# Patient Record
Sex: FEMALE | Race: WHITE | NOT HISPANIC OR LATINO | Employment: FULL TIME | URBAN - METROPOLITAN AREA
[De-identification: names, ages, dates, MRNs, and addresses within clinical notes are randomized per-mention and may not be internally consistent; named-entity substitution may affect disease eponyms.]

---

## 2018-10-11 ENCOUNTER — HOSPITAL ENCOUNTER (OUTPATIENT)
Facility: MEDICAL CENTER | Age: 58
End: 2018-10-11
Payer: COMMERCIAL

## 2018-10-11 PROCEDURE — 82947 ASSAY GLUCOSE BLOOD QUANT: CPT

## 2018-10-11 PROCEDURE — 80061 LIPID PANEL: CPT

## 2018-10-12 LAB
CHOLEST SERPL-MCNC: 196 MG/DL (ref 100–199)
FASTING STATUS PATIENT QL REPORTED: NORMAL
GLUCOSE SERPL-MCNC: 98 MG/DL (ref 65–99)
HDLC SERPL-MCNC: 67 MG/DL
LDLC SERPL CALC-MCNC: 117 MG/DL
TRIGL SERPL-MCNC: 60 MG/DL (ref 0–149)

## 2019-04-08 ENCOUNTER — TELEPHONE (OUTPATIENT)
Dept: SCHEDULING | Facility: IMAGING CENTER | Age: 59
End: 2019-04-08

## 2019-06-03 ENCOUNTER — OFFICE VISIT (OUTPATIENT)
Dept: MEDICAL GROUP | Facility: PHYSICIAN GROUP | Age: 59
End: 2019-06-03
Payer: COMMERCIAL

## 2019-06-03 VITALS
SYSTOLIC BLOOD PRESSURE: 130 MMHG | BODY MASS INDEX: 24.91 KG/M2 | TEMPERATURE: 97.3 F | WEIGHT: 155 LBS | DIASTOLIC BLOOD PRESSURE: 80 MMHG | HEART RATE: 74 BPM | OXYGEN SATURATION: 100 % | HEIGHT: 66 IN

## 2019-06-03 DIAGNOSIS — G47.00 INSOMNIA, UNSPECIFIED TYPE: ICD-10-CM

## 2019-06-03 DIAGNOSIS — F41.9 ANXIETY: ICD-10-CM

## 2019-06-03 DIAGNOSIS — Z00.00 BLOOD TESTS FOR ROUTINE GENERAL PHYSICAL EXAMINATION: ICD-10-CM

## 2019-06-03 DIAGNOSIS — L65.9 HAIR LOSS: ICD-10-CM

## 2019-06-03 PROCEDURE — 99204 OFFICE O/P NEW MOD 45 MIN: CPT | Performed by: PHYSICIAN ASSISTANT

## 2019-06-03 RX ORDER — CITALOPRAM HYDROBROMIDE 10 MG/1
10 TABLET ORAL DAILY
Qty: 30 TAB | Refills: 2 | Status: SHIPPED | OUTPATIENT
Start: 2019-06-03 | End: 2019-07-01

## 2019-06-03 ASSESSMENT — PATIENT HEALTH QUESTIONNAIRE - PHQ9
CLINICAL INTERPRETATION OF PHQ2 SCORE: 1
SUM OF ALL RESPONSES TO PHQ QUESTIONS 1-9: 11
5. POOR APPETITE OR OVEREATING: 1 - SEVERAL DAYS

## 2019-06-03 NOTE — PROGRESS NOTES
"Edel Wagner is a 59 y.o. female here for hair loss and multiple concerns. Is a new patient to me and [facility] and is also establishing care today.    Previous PCP: None    HPI:      Patient presents to the office today to establish care with me. She denies having a previous PCP. She is here with multiple concerns today which are detailed below:    \"Half my hair fell out.\" Has noticed ongoing hair loss over the last 2 months. When she showers, pulls out handfuls of hair. She saved it to see how much she's been losing and it has amounted to two large handfuls. She recently started eating more red meat and protein and has noticed some re-growth since then--prior to that mostly adhered to vegan diet.    \"I have trouble just relaxing.\" Feels tense a lot of the time. Having trouble focusing with frequent racing thoughts and feeling of being unable to catch her breath. Is trying to stay motivated but having difficulty. She feels she may have \"a touch\" of depression. Still feels able to keep up with daily activities and house chores. She endorses a lot of responsibilities at work which she feels is where most of her stress is coming from. She feels this anxiety has been ongoing since 2010 when she became a single mother and moved to this area. Has tried to manage conservatively with eating right, exercising, and apple cider vinegar, breathing exercises--none of which are significantly helping. One of her friends gave her a lorazepam tablet which seemed to help with her racing thoughts but made her sleepy which she didn't like.    \"I'm having trouble sleeping.\" Wakes up frequently throughout the night. Has to turn TV on \"to shut my mind off\" and then is able to go back to sleep. Has started vaping medical marijuana which was recommended by a friend of hers. She does feel that it helps her to fall asleep initially but still has difficulty staying asleep.     \"I hurt my toe.\" Over the holidays, ran into something and hit " her right foot. Then about 6 months later, fell over dog gate hitting the same spot. Still swollen and bruised.    Depression Screening    Little interest or pleasure in doing things?  0 - not at all   Feeling down, depressed , or hopeless? 1 - several days   Trouble falling or staying asleep, or sleeping too much?  3 - nearly every day   Feeling tired or having little energy?  3 - nearly every day   Poor appetite or overeating?  1 - several days   Feeling bad about yourself - or that you are a failure or have let yourself or your family down? 3 - nearly every day   Trouble concentrating on things, such as reading the newspaper or watching television? 0 - not at all   Moving or speaking so slowly that other people could have noticed.  Or the opposite - being so fidgety or restless that you have been moving around a lot more than usual?  0 - not at all   Thoughts that you would be better off dead, or of hurting yourself?  0 - not at all   Patient Health Questionnaire Score: 11     CHRISTOPH-7: 15    Current medicines (including changes today)  No current outpatient prescriptions on file.     No current facility-administered medications for this visit.      She  has no past medical history on file.  She  has a past surgical history that includes dilation and curettage (2000).  Social History   Substance Use Topics   • Smoking status: Never Smoker   • Smokeless tobacco: Never Used   • Alcohol use No     Social History     Social History Narrative   • No narrative on file     Family History   Problem Relation Age of Onset   • GI Mother         IBS   • Cancer Father         mesothelioma   • Depression Father    • Hypertension Father    • Seizures Father         epilepsy     No family status information on file.         ROS  +weight gain (10 lbs in last 6 months)  +cold intolerance  All other systems reviewed and are negative       Objective:     /80 (BP Location: Right arm, Patient Position: Sitting, BP Cuff Size: Adult)  "  Pulse 74   Temp 36.3 °C (97.3 °F)   Ht 1.676 m (5' 6\")   Wt 70.3 kg (155 lb)   SpO2 100%  Body mass index is 25.02 kg/m².  Physical Exam:    Constitutional: Alert, well-appearing, very pleasant, no distress.  Skin: Warm, dry, good turgor, no rashes in visible areas.  Eye: Equal, round and reactive, conjunctiva clear, lids normal.  ENMT: Lips without lesions, good dentition, oropharynx clear.  Neck: Trachea midline, no masses, no thyromegaly. No cervical or supraclavicular lymphadenopathy.  Respiratory: Unlabored respiratory effort, lungs clear to auscultation, no wheezes, no ronchi.  Cardiovascular: Normal S1, S2, no murmur, no edema.  Abdomen: Soft, non-tender, no masses, no hepatosplenomegaly.  Psych: Alert and oriented x3, normal affect and mood. Appears somewhat anxious.      Assessment and Plan:   The following treatment plan was discussed    1. Hair loss  New problem, uncontrolled. Noticing ongoing hair loss. Will obtain thyroid and other screening lab work to rule out secondary cause. Results to be discussed at next office visit.  - TSH WITH REFLEX TO FT4; Future    2. Anxiety  New problem, uncontrolled. Ongoing symptoms since 2010 with progressive worsening. CHRISTOPH-7 screening completed in office with score of 15. Depression screening (PHQ-9) score was 11, but most of her positive symptoms are related to her anxiety, so does not currently fit criteria for MDD. Labs as below to rule out secondary contributors. Given daily symptoms with functional limitation, recommend maintenance medication which she is agreeable with. Will start citalopram 10 mg daily with follow-up in 1 month. We discussed rationale for avoidance of BZD treatment.  - TSH WITH REFLEX TO FT4; Future  - citalopram (CELEXA) 10 MG tablet; Take 1 Tab by mouth every day.  Dispense: 30 Tab; Refill: 2    3. Insomnia, unspecified type  New problem, uncontrolled and suspect related to current anxiety issues. Hopefully will improve with SSRI " treatment. Will re-assess at next office visit.    4. Blood tests for routine general physical examination  Patient advised to complete fasting screening lab work prior to next office visit.  - CBC WITH DIFFERENTIAL; Future  - Comp Metabolic Panel; Future  - Lipid Profile; Future  - TSH WITH REFLEX TO FT4; Future  - VITAMIN D,25 HYDROXY; Future      Followup: Return in about 1 month (around 7/3/2019) for follow-up anxiety, lab results; Short.    Hilaria Davis P.A.-C.

## 2019-06-03 NOTE — PATIENT INSTRUCTIONS
Citalopram tablets  What is this medicine?  CITALOPRAM (sye ROBBY oh pram) is a medicine for depression.  This medicine may be used for other purposes; ask your health care provider or pharmacist if you have questions.  COMMON BRAND NAME(S): Celexa  What should I tell my health care provider before I take this medicine?  They need to know if you have any of these conditions:  -bleeding disorders  -bipolar disorder or a family history of bipolar disorder  -glaucoma  -heart disease  -history of irregular heartbeat  -kidney disease  -liver disease  -low levels of magnesium or potassium in the blood  -receiving electroconvulsive therapy  -seizures  -suicidal thoughts, plans, or attempt; a previous suicide attempt by you or a family member  -take medicines that treat or prevent blood clots  -thyroid disease  -an unusual or allergic reaction to citalopram, escitalopram, other medicines, foods, dyes, or preservatives  -pregnant or trying to become pregnant  -breast-feeding  How should I use this medicine?  Take this medicine by mouth with a glass of water. Follow the directions on the prescription label. You can take it with or without food. Take your medicine at regular intervals. Do not take your medicine more often than directed. Do not stop taking this medicine suddenly except upon the advice of your doctor. Stopping this medicine too quickly may cause serious side effects or your condition may worsen.  A special MedGuide will be given to you by the pharmacist with each prescription and refill. Be sure to read this information carefully each time.  Talk to your pediatrician regarding the use of this medicine in children. Special care may be needed.  Patients over 60 years old may have a stronger reaction and need a smaller dose.  Overdosage: If you think you have taken too much of this medicine contact a poison control center or emergency room at once.  NOTE: This medicine is only for you. Do not share this medicine with  others.  What if I miss a dose?  If you miss a dose, take it as soon as you can. If it is almost time for your next dose, take only that dose. Do not take double or extra doses.  What may interact with this medicine?  Do not take this medicine with any of the following medications:  -certain medicines for fungal infections like fluconazole, itraconazole, ketoconazole, posaconazole, voriconazole  -cisapride  -dofetilide  -dronedarone  -escitalopram  -linezolid  -MAOIs like Carbex, Eldepryl, Marplan, Nardil, and Parnate  -methylene blue (injected into a vein)  -pimozide  -thioridazine  -ziprasidone  This medicine may also interact with the following medications:  -alcohol  -amphetamines  -aspirin and aspirin-like medicines  -carbamazepine  -certain medicines for depression, anxiety, or psychotic disturbances  -certain medicines for infections like chloroquine, clarithromycin, erythromycin, furazolidone, isoniazid, pentamidine  -certain medicines for migraine headaches like almotriptan, eletriptan, frovatriptan, naratriptan, rizatriptan, sumatriptan, zolmitriptan  -certain medicines for sleep  -certain medicines that treat or prevent blood clots like dalteparin, enoxaparin, warfarin  -cimetidine  -diuretics  -fentanyl  -lithium  -methadone  -metoprolol  -NSAIDs, medicines for pain and inflammation, like ibuprofen or naproxen  -omeprazole  -other medicines that prolong the QT interval (cause an abnormal heart rhythm)  -procarbazine  -rasagiline  -supplements like Courtney's wort, kava kava, valerian  -tramadol  -tryptophan  This list may not describe all possible interactions. Give your health care provider a list of all the medicines, herbs, non-prescription drugs, or dietary supplements you use. Also tell them if you smoke, drink alcohol, or use illegal drugs. Some items may interact with your medicine.  What should I watch for while using this medicine?  Tell your doctor if your symptoms do not get better or if they  get worse. Visit your doctor or health care professional for regular checks on your progress. Because it may take several weeks to see the full effects of this medicine, it is important to continue your treatment as prescribed by your doctor.  Patients and their families should watch out for new or worsening thoughts of suicide or depression. Also watch out for sudden changes in feelings such as feeling anxious, agitated, panicky, irritable, hostile, aggressive, impulsive, severely restless, overly excited and hyperactive, or not being able to sleep. If this happens, especially at the beginning of treatment or after a change in dose, call your health care professional.  You may get drowsy or dizzy. Do not drive, use machinery, or do anything that needs mental alertness until you know how this medicine affects you. Do not stand or sit up quickly, especially if you are an older patient. This reduces the risk of dizzy or fainting spells. Alcohol may interfere with the effect of this medicine. Avoid alcoholic drinks.  Your mouth may get dry. Chewing sugarless gum or sucking hard candy, and drinking plenty of water will help. Contact your doctor if the problem does not go away or is severe.  What side effects may I notice from receiving this medicine?  Side effects that you should report to your doctor or health care professional as soon as possible:  -allergic reactions like skin rash, itching or hives, swelling of the face, lips, or tongue  -anxious  -black, tarry stools  -breathing problems  -changes in vision  -chest pain  -confusion  -elevated mood, decreased need for sleep, racing thoughts, impulsive behavior  -eye pain  -fast, irregular heartbeat  -feeling faint or lightheaded, falls  -feeling agitated, angry, or irritable  -hallucination, loss of contact with reality  -loss of balance or coordination  -loss of memory  -painful or prolonged erections  -restlessness, pacing, inability to keep  still  -seizures  -stiff muscles  -suicidal thoughts or other mood changes  -trouble sleeping  -unusual bleeding or bruising  -unusually weak or tired  -vomiting  Side effects that usually do not require medical attention (report to your doctor or health care professional if they continue or are bothersome):  -change in appetite or weight  -change in sex drive or performance  -dizziness  -headache  -increased sweating  -indigestion, nausea  -tremors  This list may not describe all possible side effects. Call your doctor for medical advice about side effects. You may report side effects to FDA at 3-882-FDA-5437.  Where should I keep my medicine?  Keep out of reach of children.  Store at room temperature between 15 and 30 degrees C (59 and 86 degrees F). Throw away any unused medicine after the expiration date.  NOTE: This sheet is a summary. It may not cover all possible information. If you have questions about this medicine, talk to your doctor, pharmacist, or health care provider.  © 2018 [employer]vier/Gold Standard (2017-05-22 13:18:52)

## 2019-06-04 ENCOUNTER — HOSPITAL ENCOUNTER (OUTPATIENT)
Dept: LAB | Facility: MEDICAL CENTER | Age: 59
End: 2019-06-04
Attending: PHYSICIAN ASSISTANT
Payer: COMMERCIAL

## 2019-06-04 DIAGNOSIS — L65.9 HAIR LOSS: ICD-10-CM

## 2019-06-04 DIAGNOSIS — F41.9 ANXIETY: ICD-10-CM

## 2019-06-04 DIAGNOSIS — Z00.00 BLOOD TESTS FOR ROUTINE GENERAL PHYSICAL EXAMINATION: ICD-10-CM

## 2019-06-04 LAB
25(OH)D3 SERPL-MCNC: 13 NG/ML (ref 30–100)
ALBUMIN SERPL BCP-MCNC: 4.1 G/DL (ref 3.2–4.9)
ALBUMIN/GLOB SERPL: 1.4 G/DL
ALP SERPL-CCNC: 51 U/L (ref 30–99)
ALT SERPL-CCNC: 12 U/L (ref 2–50)
ANION GAP SERPL CALC-SCNC: 4 MMOL/L (ref 0–11.9)
AST SERPL-CCNC: 17 U/L (ref 12–45)
BASOPHILS # BLD AUTO: 0.8 % (ref 0–1.8)
BASOPHILS # BLD: 0.03 K/UL (ref 0–0.12)
BILIRUB SERPL-MCNC: 0.5 MG/DL (ref 0.1–1.5)
BUN SERPL-MCNC: 9 MG/DL (ref 8–22)
CALCIUM SERPL-MCNC: 9.2 MG/DL (ref 8.5–10.5)
CHLORIDE SERPL-SCNC: 105 MMOL/L (ref 96–112)
CHOLEST SERPL-MCNC: 164 MG/DL (ref 100–199)
CO2 SERPL-SCNC: 29 MMOL/L (ref 20–33)
CREAT SERPL-MCNC: 0.69 MG/DL (ref 0.5–1.4)
EOSINOPHIL # BLD AUTO: 0.23 K/UL (ref 0–0.51)
EOSINOPHIL NFR BLD: 5.9 % (ref 0–6.9)
ERYTHROCYTE [DISTWIDTH] IN BLOOD BY AUTOMATED COUNT: 38.5 FL (ref 35.9–50)
FASTING STATUS PATIENT QL REPORTED: NORMAL
GLOBULIN SER CALC-MCNC: 2.9 G/DL (ref 1.9–3.5)
GLUCOSE SERPL-MCNC: 81 MG/DL (ref 65–99)
HCT VFR BLD AUTO: 42.1 % (ref 37–47)
HDLC SERPL-MCNC: 57 MG/DL
HGB BLD-MCNC: 13.8 G/DL (ref 12–16)
IMM GRANULOCYTES # BLD AUTO: 0 K/UL (ref 0–0.11)
IMM GRANULOCYTES NFR BLD AUTO: 0 % (ref 0–0.9)
LDLC SERPL CALC-MCNC: 97 MG/DL
LYMPHOCYTES # BLD AUTO: 1.32 K/UL (ref 1–4.8)
LYMPHOCYTES NFR BLD: 33.8 % (ref 22–41)
MCH RBC QN AUTO: 28.6 PG (ref 27–33)
MCHC RBC AUTO-ENTMCNC: 32.8 G/DL (ref 33.6–35)
MCV RBC AUTO: 87.3 FL (ref 81.4–97.8)
MONOCYTES # BLD AUTO: 0.41 K/UL (ref 0–0.85)
MONOCYTES NFR BLD AUTO: 10.5 % (ref 0–13.4)
NEUTROPHILS # BLD AUTO: 1.92 K/UL (ref 2–7.15)
NEUTROPHILS NFR BLD: 49 % (ref 44–72)
NRBC # BLD AUTO: 0 K/UL
NRBC BLD-RTO: 0 /100 WBC
PLATELET # BLD AUTO: 164 K/UL (ref 164–446)
PMV BLD AUTO: 11.7 FL (ref 9–12.9)
POTASSIUM SERPL-SCNC: 4.3 MMOL/L (ref 3.6–5.5)
PROT SERPL-MCNC: 7 G/DL (ref 6–8.2)
RBC # BLD AUTO: 4.82 M/UL (ref 4.2–5.4)
SODIUM SERPL-SCNC: 138 MMOL/L (ref 135–145)
TRIGL SERPL-MCNC: 49 MG/DL (ref 0–149)
TSH SERPL DL<=0.005 MIU/L-ACNC: 1.29 UIU/ML (ref 0.38–5.33)
WBC # BLD AUTO: 3.9 K/UL (ref 4.8–10.8)

## 2019-06-04 PROCEDURE — 36415 COLL VENOUS BLD VENIPUNCTURE: CPT

## 2019-06-04 PROCEDURE — 80053 COMPREHEN METABOLIC PANEL: CPT

## 2019-06-04 PROCEDURE — 82306 VITAMIN D 25 HYDROXY: CPT

## 2019-06-04 PROCEDURE — 80061 LIPID PANEL: CPT

## 2019-06-04 PROCEDURE — 84443 ASSAY THYROID STIM HORMONE: CPT

## 2019-06-04 PROCEDURE — 85025 COMPLETE CBC W/AUTO DIFF WBC: CPT

## 2019-06-05 DIAGNOSIS — E55.9 VITAMIN D DEFICIENCY: ICD-10-CM

## 2019-06-05 RX ORDER — ERGOCALCIFEROL 1.25 MG/1
50000 CAPSULE ORAL
Qty: 12 CAP | Refills: 0 | Status: SHIPPED | OUTPATIENT
Start: 2019-06-05 | End: 2020-11-03

## 2019-06-06 ENCOUNTER — TELEPHONE (OUTPATIENT)
Dept: MEDICAL GROUP | Facility: PHYSICIAN GROUP | Age: 59
End: 2019-06-06

## 2019-06-06 NOTE — TELEPHONE ENCOUNTER
Pt. Called stating that her anxiety medication that sh was recently placed on works well for her while she is at home but it does not work for her while she is at work. Pt. Is asking if she can double her dose. Pt. Is  Currently taking 10MG of citalopram

## 2019-07-01 ENCOUNTER — OFFICE VISIT (OUTPATIENT)
Dept: MEDICAL GROUP | Facility: PHYSICIAN GROUP | Age: 59
End: 2019-07-01
Payer: COMMERCIAL

## 2019-07-01 VITALS
WEIGHT: 155 LBS | RESPIRATION RATE: 18 BRPM | DIASTOLIC BLOOD PRESSURE: 70 MMHG | SYSTOLIC BLOOD PRESSURE: 108 MMHG | OXYGEN SATURATION: 97 % | HEIGHT: 66 IN | TEMPERATURE: 97.6 F | HEART RATE: 78 BPM | BODY MASS INDEX: 24.91 KG/M2

## 2019-07-01 DIAGNOSIS — Z12.39 ENCOUNTER FOR SCREENING FOR MALIGNANT NEOPLASM OF BREAST: ICD-10-CM

## 2019-07-01 DIAGNOSIS — F41.9 ANXIETY: ICD-10-CM

## 2019-07-01 DIAGNOSIS — E55.9 VITAMIN D DEFICIENCY: ICD-10-CM

## 2019-07-01 PROCEDURE — 99214 OFFICE O/P EST MOD 30 MIN: CPT | Performed by: PHYSICIAN ASSISTANT

## 2019-07-01 RX ORDER — CITALOPRAM 20 MG/1
20 TABLET ORAL DAILY
Qty: 30 TAB | Refills: 5 | Status: SHIPPED | OUTPATIENT
Start: 2019-07-01 | End: 2019-08-05

## 2019-07-01 NOTE — PROGRESS NOTES
"Subjective:   Edel Wagner is a 59 y.o. female here today for follow-up on anxiety, lab results. Is an established patient of mine.    HPI:    Edel returns today for follow-up on issues discussed at last office visit 1 month ago. At that time, Edel had complained of significant anxiety as well as difficulty sleeping with frequent nighttime awakenings. I started her on citalopram 10 mg daily. She states that after about 3 days, she increased on her own to 20 mg daily as she did not feel that the 10 mg was enough. Since making this change, she has noticed a significant improvement in her symptoms. She tells me that she feels peace in her soul, which is something she hasn't felt in a long time. She was experiencing some mild nausea in the beginning, but this has resolved with no GI symptoms currently.    Labs were obtained after Edel's last appointment to help rule out secondary medical conditions. Vitamin D level was noted to be quite low at 13 and patient has already been started on prescription weekly supplement. Labs were otherwise normal. She has noticed that an aching sensation in her bones that she was experiencing has started to improve since starting the supplement. She also feels that her hair loss has slowed down.      Current medicines (including changes today)  Current Outpatient Prescriptions   Medication Sig Dispense Refill   • citalopram (CELEXA) 20 MG Tab Take 1 Tab by mouth every day. 30 Tab 5   • vitamin D, Ergocalciferol, (DRISDOL) 63287 units Cap capsule Take 1 Cap by mouth every 7 days. 12 Cap 0     No current facility-administered medications for this visit.      She  has no past medical history on file.    ROS  As per HPI.       Objective:     /70 (BP Location: Right arm, Patient Position: Sitting, BP Cuff Size: Adult)   Pulse 78   Temp 36.4 °C (97.6 °F) (Temporal)   Resp 18   Ht 1.676 m (5' 6\")   Wt 70.3 kg (155 lb)   SpO2 97%  Body mass index is 25.02 kg/m². "     Physical Exam:  Constitutional: Alert, well-appearing, very pleasant, no distress.  Skin: No rashes in visible areas.  Eye: Pupils are equal and round, conjunctiva clear, lids normal.  ENMT: Lips without lesions, moist mucus membranes.      Assessment and Plan:   The following treatment plan was discussed    1. Anxiety  Established problem, well-controlled since starting SSRI in the form of citalopram. Doing well on 20 mg daily. Tolerating well. Will continue current dose with next f/u in 6 months.  - citalopram (CELEXA) 20 MG Tab; Take 1 Tab by mouth every day.  Dispense: 30 Tab; Refill: 5    2. Vitamin D deficiency  New problem, uncontrolled but likely improving with prescription supplementation. Patient advised to continue supplement for total of 12 weeks and then have level re-checked. Discussed that depending on repeat level, will either continue prescription supplement or have her transition to OTC.    3. Encounter for screening for malignant neoplasm of breast  - MA-SCREEN MAMMO W/CAD-BILAT; Future      Followup: Return in about 6 months (around 1/1/2020) for f/u anxiety; Short.    Hilaria Davis P.A.-C.

## 2019-08-05 ENCOUNTER — OFFICE VISIT (OUTPATIENT)
Dept: MEDICAL GROUP | Facility: PHYSICIAN GROUP | Age: 59
End: 2019-08-05
Payer: COMMERCIAL

## 2019-08-05 ENCOUNTER — HOSPITAL ENCOUNTER (OUTPATIENT)
Dept: RADIOLOGY | Facility: MEDICAL CENTER | Age: 59
End: 2019-08-05
Attending: PHYSICIAN ASSISTANT
Payer: COMMERCIAL

## 2019-08-05 VITALS
DIASTOLIC BLOOD PRESSURE: 74 MMHG | TEMPERATURE: 97.5 F | OXYGEN SATURATION: 97 % | SYSTOLIC BLOOD PRESSURE: 110 MMHG | WEIGHT: 155 LBS | HEART RATE: 68 BPM | BODY MASS INDEX: 24.91 KG/M2 | HEIGHT: 66 IN

## 2019-08-05 DIAGNOSIS — F41.9 ANXIETY: ICD-10-CM

## 2019-08-05 DIAGNOSIS — N64.4 BREAST PAIN IN FEMALE: Primary | ICD-10-CM

## 2019-08-05 DIAGNOSIS — N64.4 BREAST PAIN IN FEMALE: ICD-10-CM

## 2019-08-05 PROCEDURE — 76642 ULTRASOUND BREAST LIMITED: CPT | Mod: RT

## 2019-08-05 PROCEDURE — 99214 OFFICE O/P EST MOD 30 MIN: CPT | Performed by: PHYSICIAN ASSISTANT

## 2019-08-05 PROCEDURE — G0279 TOMOSYNTHESIS, MAMMO: HCPCS

## 2019-08-05 RX ORDER — CITALOPRAM 40 MG/1
40 TABLET ORAL DAILY
Qty: 30 TAB | Refills: 5 | Status: SHIPPED | OUTPATIENT
Start: 2019-08-05 | End: 2020-03-02

## 2019-08-05 NOTE — PROGRESS NOTES
"Subjective:   Edel Wagner is a 59 y.o. female here today for breast pain. Is an established patient of mine.    HPI:    Edel presents to the office today with complaints of bilateral breast pain. Onset was about 1 week ago. Pain has mostly been on the left side directly underneath the nipple, but yesterday felt some twinges of pain on the lateral aspect of her right breast as well.  She does do periodic SBEs and has not noticed anything abnormal. Specifically denies any skin changes, lumps, or nipple discharge. Last screening mammogram was in 2009. Denies any abnormalities. No family history of breast cancer. She questions whether the breast pain may be related to her low vitamin D levels. She has noticed some aching in her legs, but also states that she has noticed it mainly with weather changes.    She also mentions that she has increased the dose of her citalopram to 40 mg on work days and 30 mg on off days. She feels that this is working much better for her than the 20 mg.       Current medicines (including changes today)  Current Outpatient Medications   Medication Sig Dispense Refill   • citalopram (CELEXA) 20 MG Tab Take 1 Tab by mouth every day. 30 Tab 5   • vitamin D, Ergocalciferol, (DRISDOL) 38738 units Cap capsule Take 1 Cap by mouth every 7 days. 12 Cap 0     No current facility-administered medications for this visit.      She  has no past medical history on file.    ROS  As per HPI.       Objective:     /74 (BP Location: Left arm, Patient Position: Sitting, BP Cuff Size: Adult)   Pulse 68   Temp 36.4 °C (97.5 °F)   Ht 1.676 m (5' 6\")   Wt 70.3 kg (155 lb)   SpO2 97%  Body mass index is 25.02 kg/m².     Physical Exam:  Constitutional: Alert, well-appearing, very pleasant, no distress.  Skin: Warm, dry, good turgor, no rashes in visible areas.  Eye: Pupils are equal and round, conjunctiva clear, lids normal.  ENMT: Lips without lesions, moist mucus membranes.  Breast: Breasts are " normal-appearing and symmetric bilaterally. No skin abnormalities visualized. There are no masses palpated. No visible nipple discharge. There is tenderness to palpation of the upper outer quadrant of the left breast. Right breast is without tenderness. No axillary or supraclavicular lymphadenopathy palpated.      Assessment and Plan:   The following treatment plan was discussed    1. Breast pain in female  New problem, uncontrolled. Experiencing bilateral breast pain, L > R. No abnormalities noted on exam other than tenderness. Explained that I don't feel symptoms are secondary to low vitamin D. She is due for re-check of level after completing remaining 3 weeks of prescription supplement. Screening mammograms are out-of-date. Recommend further evaluation with bilateral diagnostic mammography. Also advise OTC NSAIDs as-needed for pain and wearing comfortable, non-underwire bra. Further recommendations pending mammogram results.   - MA-DIAGNOSTIC MAMMO W/CAD-BILAT; Future    2. Anxiety  Established problem, well-controlled since dose increase of citalopram. Recommend that she continue 40 mg on daily basis rather than alternating between 40 mg and 30 mg dosing. I have sent new prescription to her pharmacy.      Followup: Return for f/u pending results.    Hilaria Davis P.A.-C.

## 2019-08-12 ENCOUNTER — HOSPITAL ENCOUNTER (OUTPATIENT)
Facility: MEDICAL CENTER | Age: 59
End: 2019-08-12
Attending: PHYSICIAN ASSISTANT
Payer: COMMERCIAL

## 2019-08-12 ENCOUNTER — OFFICE VISIT (OUTPATIENT)
Dept: MEDICAL GROUP | Facility: PHYSICIAN GROUP | Age: 59
End: 2019-08-12
Payer: COMMERCIAL

## 2019-08-12 VITALS
WEIGHT: 143 LBS | OXYGEN SATURATION: 97 % | DIASTOLIC BLOOD PRESSURE: 72 MMHG | HEIGHT: 66 IN | HEART RATE: 60 BPM | SYSTOLIC BLOOD PRESSURE: 110 MMHG | BODY MASS INDEX: 22.98 KG/M2

## 2019-08-12 DIAGNOSIS — Z01.419 ENCOUNTER FOR ROUTINE GYNECOLOGICAL EXAMINATION WITH PAPANICOLAOU SMEAR OF CERVIX: ICD-10-CM

## 2019-08-12 PROCEDURE — 99396 PREV VISIT EST AGE 40-64: CPT | Performed by: PHYSICIAN ASSISTANT

## 2019-08-12 PROCEDURE — 88175 CYTOPATH C/V AUTO FLUID REDO: CPT

## 2019-08-12 PROCEDURE — 87624 HPV HI-RISK TYP POOLED RSLT: CPT

## 2019-08-12 NOTE — PROGRESS NOTES
"Chief Complaint: Well-woman    Edel Wagner is a 59 y.o. female who presents for annual gynecologic exam with pap smear. Is an established patient of mine.      Pt has GYN provider: no  Last pap smear: about 17 years ago - believes was normal. Denies any previous abnormal paps.  Last mammogram: 8/5/19  Last Td: unknown but states is not up-to-date    Gynecologic concerns today:     Patient specifically denies any vaginal discharge, itching, burning, foul odor, genital lesions, pelvic pain. She is not currently sexually active. No current concern for STDs. Menopause around 2008.    She also denies any breast concerns. The tenderness she was having previously has completely resolved. Recent diagnostic mammography and US were normal. She specifically denies any breast skin changes, palpable lumps, nipple discharge, breast tenderness, or swollen axillary lymph nodes.    No family history of gynecologic cancers.    She  has no past medical history on file.  She  has a past surgical history that includes dilation and curettage (2000).  Current Outpatient Medications   Medication Sig Dispense Refill   • citalopram (CELEXA) 40 MG Tab Take 1 Tab by mouth every day. 30 Tab 5   • vitamin D, Ergocalciferol, (DRISDOL) 90808 units Cap capsule Take 1 Cap by mouth every 7 days. 12 Cap 0     No current facility-administered medications for this visit.      Social History     Tobacco Use   • Smoking status: Never Smoker   • Smokeless tobacco: Never Used   Substance Use Topics   • Alcohol use: No   • Drug use: Yes     Types: Marijuana     Comment: vapes at night        Review Of Systems  As above.      PHYSICAL EXAMINATION:  /72 (BP Location: Left arm, Patient Position: Sitting, BP Cuff Size: Adult)   Pulse 60   Ht 1.676 m (5' 6\")   Wt 64.9 kg (143 lb)   SpO2 97%   Body mass index is 23.08 kg/m².  Wt Readings from Last 4 Encounters:   08/12/19 64.9 kg (143 lb)   08/05/19 70.3 kg (155 lb)   07/01/19 70.3 kg (155 lb) "   06/03/19 70.3 kg (155 lb)       Constitutional: Alert, well-appearing, no distress.  Skin: Warm, dry, good turgor, no rashes or suspicious lesions in visible areas.  Eye: Conjunctivae clear, lids normal.  ENMT: Lips without lesions, moist mucus membranes.  Breast: Breasts are normal-appearing and symmetric bilaterally. No skin abnormalities visualized. There are no masses palpated, no tenderness. No visible nipple discharge. No axillary or supraclavicular lymphadenopathy palpated.  Pelvic: Vulvovaginal atrophy noted. Otherwise Normal-appearing external genitalia. No genital lesions noted. On speculum insertion, vaginal walls have normal rugated appearance. No abnormal-appearing discharge is noted. Cervix easily visualized and appears pink, non-friable and without visible lesions. On bimanual exam, there is no cervical motion tenderness. Uterus is mobile, non-tender and without palpable masses. No adnexal masses or tenderness.    A chaperone was offered to the patient during today's exam. Patient declined chaperone.     ASSESSMENT/PLAN:    1. Encounter for routine gynecological examination with Papanicolaou smear of cervix  Normal exam today. Pap collected.  HCM:  discussed   - THINPREP PAP WITH HPV; Future      Return in about 6 months (around 2/12/2020).      Hilaria Davis P.A.-C.

## 2019-08-13 LAB
CYTOLOGY REG CYTOL: NORMAL
HPV HR 12 DNA CVX QL NAA+PROBE: NEGATIVE
HPV16 DNA SPEC QL NAA+PROBE: NEGATIVE
HPV18 DNA SPEC QL NAA+PROBE: NEGATIVE
SPECIMEN SOURCE: NORMAL

## 2019-09-05 DIAGNOSIS — E55.9 VITAMIN D DEFICIENCY: ICD-10-CM

## 2019-09-05 NOTE — LETTER
Edel Wagner  [address]  [place] [address]            9/10/2019        Dear Edel,      After several attempts, Hilaria Davis P.A.-C.'s office has been unable to reach you by phone regarding your recent medication request.     We ask that you contact us at [phone] at your earliest convenience. Our office hours are from 8:00a - 5:00p Monday thru Friday.    Kind regards,   Mildred Hurley, Med Ass't

## 2019-09-06 RX ORDER — ERGOCALCIFEROL 1.25 MG/1
50000 CAPSULE ORAL
Qty: 12 CAP | Refills: 0 | OUTPATIENT
Start: 2019-09-06

## 2019-09-06 NOTE — TELEPHONE ENCOUNTER
Phone Number Called: [phone] (home) [phone] (work)    Call outcome: left message for patient to call back regarding message below

## 2019-09-06 NOTE — TELEPHONE ENCOUNTER
Please inform patient that she needs to have vitamin D level re-checked prior to refill. I previously ordered this so she can go to the lab to have this done anytime. Does not need to be fasting.  Hilaria Davis P.A.-C.

## 2019-09-09 NOTE — TELEPHONE ENCOUNTER
Phone Number Called: [phone] (home)     Call outcome: left message for patient to call back regarding message below

## 2019-09-10 NOTE — TELEPHONE ENCOUNTER
Phone Number Called: [phone] (home) [phone] (work)    Call outcome: 3rd attempted, letter mailed to pt.

## 2020-05-20 ENCOUNTER — HOSPITAL ENCOUNTER (OUTPATIENT)
Facility: MEDICAL CENTER | Age: 60
End: 2020-05-20
Payer: COMMERCIAL

## 2020-05-23 LAB
SARS-COV-2 RNA SPEC QL NAA+PROBE: NOT DETECTED
SPECIMEN SOURCE: NORMAL

## 2020-09-15 ENCOUNTER — TELEPHONE (OUTPATIENT)
Dept: SCHEDULING | Facility: IMAGING CENTER | Age: 60
End: 2020-09-15

## 2020-09-22 ENCOUNTER — OFFICE VISIT (OUTPATIENT)
Dept: MEDICAL GROUP | Facility: IMAGING CENTER | Age: 60
End: 2020-09-22
Payer: COMMERCIAL

## 2020-09-22 VITALS
DIASTOLIC BLOOD PRESSURE: 72 MMHG | RESPIRATION RATE: 12 BRPM | BODY MASS INDEX: 23.11 KG/M2 | TEMPERATURE: 98.1 F | WEIGHT: 143.8 LBS | HEIGHT: 66 IN | HEART RATE: 96 BPM | OXYGEN SATURATION: 98 % | SYSTOLIC BLOOD PRESSURE: 104 MMHG

## 2020-09-22 DIAGNOSIS — F41.1 GAD (GENERALIZED ANXIETY DISORDER): ICD-10-CM

## 2020-09-22 PROCEDURE — 99203 OFFICE O/P NEW LOW 30 MIN: CPT | Performed by: FAMILY MEDICINE

## 2020-09-22 RX ORDER — CITALOPRAM 20 MG/1
20 TABLET ORAL DAILY
Qty: 90 TAB | Refills: 0 | Status: SHIPPED | OUTPATIENT
Start: 2020-09-22 | End: 2020-10-27 | Stop reason: SDUPTHER

## 2020-09-22 ASSESSMENT — ANXIETY QUESTIONNAIRES
1. FEELING NERVOUS, ANXIOUS, OR ON EDGE: NEARLY EVERY DAY
GAD7 TOTAL SCORE: 21
5. BEING SO RESTLESS THAT IT IS HARD TO SIT STILL: NEARLY EVERY DAY
6. BECOMING EASILY ANNOYED OR IRRITABLE: NEARLY EVERY DAY
2. NOT BEING ABLE TO STOP OR CONTROL WORRYING: NEARLY EVERY DAY
3. WORRYING TOO MUCH ABOUT DIFFERENT THINGS: NEARLY EVERY DAY
4. TROUBLE RELAXING: NEARLY EVERY DAY
7. FEELING AFRAID AS IF SOMETHING AWFUL MIGHT HAPPEN: NEARLY EVERY DAY

## 2020-09-22 ASSESSMENT — ENCOUNTER SYMPTOMS
ABDOMINAL PAIN: 0
DIZZINESS: 0
DIARRHEA: 0
COUGH: 0
VOMITING: 0
NAUSEA: 0
MYALGIAS: 0
DOUBLE VISION: 0
FEVER: 0
SHORTNESS OF BREATH: 0
CHILLS: 0
PALPITATIONS: 0
DEPRESSION: 1
WHEEZING: 0
NERVOUS/ANXIOUS: 1
EYE PAIN: 0
HEADACHES: 0

## 2020-09-22 ASSESSMENT — LIFESTYLE VARIABLES: SUBSTANCE_ABUSE: 0

## 2020-09-22 ASSESSMENT — PATIENT HEALTH QUESTIONNAIRE - PHQ9
CLINICAL INTERPRETATION OF PHQ2 SCORE: 2
5. POOR APPETITE OR OVEREATING: 3 - NEARLY EVERY DAY
SUM OF ALL RESPONSES TO PHQ QUESTIONS 1-9: 14

## 2020-09-22 ASSESSMENT — FIBROSIS 4 INDEX: FIB4 SCORE: 1.74

## 2020-09-22 ASSESSMENT — PAIN SCALES - GENERAL: PAINLEVEL: NO PAIN

## 2020-09-22 NOTE — PROGRESS NOTES
"Chief Complaint   Patient presents with   • Anxiety     used to take citalopram     HPI:  60 year here to establish care. She had lost her pcp and so stopped taking citalopram. She reports severe anxiety since stopping the medication. She reports worrying about anything and everything at work and home. She also reports a sense of 'whoozienss' when she watches cars drive on a high bridge or anything tall. She has a fear of heights and feel that she is up on a bridge when watching. She describes it as vertigo. This has been going on for years and resolved with citalopram. No si/hi    No Known Allergies  Current Outpatient Medications   Medication Sig Dispense Refill   • Cholecalciferol (VITAMIN D3 PO) Take 10,000 Units by mouth.     • MAGNESIUM PO Take  by mouth.     • Multiple Vitamins-Minerals (ZINC PO) Take  by mouth.     • Multiple Vitamins-Minerals (MULTIVITAMIN WOMENS 50+ ADV PO) Take  by mouth.     • citalopram (CELEXA) 40 MG Tab Take 1 tablet by mouth once daily (Patient not taking: Reported on 9/22/2020) 90 Tab 0   • vitamin D, Ergocalciferol, (DRISDOL) 32897 units Cap capsule Take 1 Cap by mouth every 7 days. (Patient not taking: Reported on 9/22/2020) 12 Cap 0     No current facility-administered medications for this visit.      Social History     Tobacco Use   • Smoking status: Never Smoker   • Smokeless tobacco: Never Used   Substance Use Topics   • Alcohol use: No   • Drug use: Yes     Frequency: 7.0 times per week     Types: Marijuana     Comment: vapes at night      Family History   Problem Relation Age of Onset   • GI Disease Mother         IBS   • Cancer Father         mesothelioma   • Depression Father    • Hypertension Father    • Seizures Father         epilepsy       /72   Pulse 96   Temp 36.7 °C (98.1 °F)   Resp 12   Ht 1.676 m (5' 6\")   Wt 65.2 kg (143 lb 12.8 oz)   SpO2 98%  Body mass index is 23.21 kg/m².  Review of Systems   Constitutional: Negative for chills, fever and " malaise/fatigue.   HENT: Negative for hearing loss and tinnitus.    Eyes: Negative for double vision and pain.   Respiratory: Negative for cough, shortness of breath and wheezing.    Cardiovascular: Negative for chest pain, palpitations and leg swelling.   Gastrointestinal: Negative for abdominal pain, diarrhea, nausea and vomiting.   Genitourinary: Negative for dysuria and frequency.   Musculoskeletal: Negative for joint pain and myalgias.   Skin: Negative for itching and rash.   Neurological: Negative for dizziness and headaches.   Psychiatric/Behavioral: Positive for depression. Negative for substance abuse and suicidal ideas. The patient is nervous/anxious.      Physical Exam   Constitutional: She is well-developed, well-nourished, and in no distress. No distress.   HENT:   Head: Normocephalic and atraumatic.   Eyes: Pupils are equal, round, and reactive to light. Conjunctivae and EOM are normal. Right eye exhibits no discharge. Left eye exhibits no discharge. No scleral icterus.   Neck: No thyromegaly present.   Pulmonary/Chest: Effort normal. No respiratory distress.   Abdominal: She exhibits no distension.   Musculoskeletal:         General: No edema.   Neurological: She is alert.   Skin: Skin is warm and dry. She is not diaphoretic.   Psychiatric:   Tearful with good judgement         All labs (last 1 month):   No visits with results within 1 Month(s) from this visit.   Latest known visit with results is:   Hospital Outpatient Visit on 05/20/2020   Component Date Value Ref Range Status   • SARS-CoV-2 Source 05/20/2020 Nasal Swab   Final   • SARS-CoV-2, KLAUS 05/20/2020 Not Detected  Not Detected Final    Comment: This test was developed and its performance characteristics determined  by [facility]. This test has not been FDA cleared or  approved. This test has been authorized by FDA under an Emergency Use  Authorization (EUA). This test is only authorized for the duration of  time the declaration  that circumstances exist justifying the  authorization of the emergency use of in vitro diagnostic tests for  detection of SARS-CoV-2 virus and/or diagnosis of COVID-19 infection  under section 564(b)(1) of the Act, 21 U.S.C. 360bbb-3(b)(1), unless  the authorization is terminated or revoked sooner.  When diagnostic testing is negative, the possibility of a false  negative result should be considered in the context of a patient's  recent exposures and the presence of clinical signs and symptoms  consistent with COVID-19. An individual without symptoms of COVID-19  and who is not shedding SARS-CoV-2 virus would expect to have a  negative (not detected) result in this assay.         Lipids:   Lab Results   Component Value Date/Time    CHOLSTRLTOT 164 06/04/2019 07:55 AM    TRIGLYCERIDE 49 06/04/2019 07:55 AM    HDL 57 06/04/2019 07:55 AM    LDL 97 06/04/2019 07:55 AM       Imaging: No results found.    A/P  Discussed guided imagery, paying attention to conversations with self, addressing past hurts, practicing living in the moment.   Return in about 4 weeks (around 10/20/2020).    Problem List Items Addressed This Visit     CHRISTOPH (generalized anxiety disorder)    Relevant Medications    citalopram (CELEXA) 20 MG Tab    Other Relevant Orders    REFERRAL TO PSYCHOLOGY        Portions of this note may be dictated using Dragon NaturallySpeaking voice recognition software.  Variances in spelling and vocabulary are possible and unintentional.  Not all areas may be caught/corrected.  Please notify me if any discrepancies are noted or if the meaning of any statement is not correct/clear.

## 2020-09-22 NOTE — LETTER
September 22, 2020        Edel Wagner  [address]  [place] [address]        To whom it may concern,     Edel is a patient under my care. It is my recommendation that she use the assistance of emotional support animals if needed.       If you have any questions or concerns, please don't hesitate to call.        Sincerely,        Gina Salmeron M.D.    Electronically Signed

## 2020-10-27 ENCOUNTER — OFFICE VISIT (OUTPATIENT)
Dept: MEDICAL GROUP | Facility: IMAGING CENTER | Age: 60
End: 2020-10-27
Payer: COMMERCIAL

## 2020-10-27 VITALS
BODY MASS INDEX: 23.82 KG/M2 | DIASTOLIC BLOOD PRESSURE: 76 MMHG | SYSTOLIC BLOOD PRESSURE: 120 MMHG | TEMPERATURE: 98 F | HEIGHT: 66 IN | WEIGHT: 148.2 LBS | RESPIRATION RATE: 12 BRPM | OXYGEN SATURATION: 96 % | HEART RATE: 76 BPM

## 2020-10-27 DIAGNOSIS — F41.1 GAD (GENERALIZED ANXIETY DISORDER): ICD-10-CM

## 2020-10-27 PROCEDURE — 99213 OFFICE O/P EST LOW 20 MIN: CPT | Performed by: FAMILY MEDICINE

## 2020-10-27 RX ORDER — CITALOPRAM 20 MG/1
20 TABLET ORAL DAILY
Qty: 90 TAB | Refills: 3 | Status: SHIPPED | OUTPATIENT
Start: 2020-10-27 | End: 2020-11-03

## 2020-10-27 ASSESSMENT — ENCOUNTER SYMPTOMS
FEVER: 0
EYE PAIN: 0
PALPITATIONS: 0
WHEEZING: 0
CHILLS: 0
NAUSEA: 0
COUGH: 0
MYALGIAS: 0
SHORTNESS OF BREATH: 0
DIZZINESS: 0
DOUBLE VISION: 0
ABDOMINAL PAIN: 0
VOMITING: 0
HEADACHES: 0
DIARRHEA: 0

## 2020-10-27 ASSESSMENT — FIBROSIS 4 INDEX: FIB4 SCORE: 1.74

## 2020-10-27 ASSESSMENT — PATIENT HEALTH QUESTIONNAIRE - PHQ9
CLINICAL INTERPRETATION OF PHQ2 SCORE: 1
SUM OF ALL RESPONSES TO PHQ QUESTIONS 1-9: 3
5. POOR APPETITE OR OVEREATING: 0 - NOT AT ALL

## 2020-10-27 ASSESSMENT — ANXIETY QUESTIONNAIRES
3. WORRYING TOO MUCH ABOUT DIFFERENT THINGS: NOT AT ALL
6. BECOMING EASILY ANNOYED OR IRRITABLE: NOT AT ALL
1. FEELING NERVOUS, ANXIOUS, OR ON EDGE: NOT AT ALL
5. BEING SO RESTLESS THAT IT IS HARD TO SIT STILL: NOT AT ALL
4. TROUBLE RELAXING: NOT AT ALL
GAD7 TOTAL SCORE: 0
2. NOT BEING ABLE TO STOP OR CONTROL WORRYING: NOT AT ALL
7. FEELING AFRAID AS IF SOMETHING AWFUL MIGHT HAPPEN: NOT AT ALL

## 2020-10-27 ASSESSMENT — PAIN SCALES - GENERAL: PAINLEVEL: NO PAIN

## 2020-10-27 NOTE — PROGRESS NOTES
"Chief Complaint   Patient presents with   • Anxiety     feels like she is doing well on citalapram   • Fatigue     possible side effect of medication    • Other     left knee and left hand feels like there is fluid build up      HPI:  60 year here for follow up after starting citalopram. She feels that she is doing well. Anxiety levels are quit low. Though she has been fatigued though not severe. We did go down on the dose though her anxiety came back.   Left knee and left hand are swollen after a move. Though she stared doing her vinegar water again 4 days ago and the swelling in the knee and left hand has significantly gone down since she restarted this.   No Known Allergies  Current Outpatient Medications   Medication Sig Dispense Refill   • citalopram (CELEXA) 20 MG Tab Take 1 Tab by mouth every day. 90 Tab 3   • Cholecalciferol (VITAMIN D3 PO) Take 10,000 Units by mouth.     • MAGNESIUM PO Take  by mouth.     • Multiple Vitamins-Minerals (ZINC PO) Take  by mouth.     • Multiple Vitamins-Minerals (MULTIVITAMIN WOMENS 50+ ADV PO) Take  by mouth.     • vitamin D, Ergocalciferol, (DRISDOL) 33395 units Cap capsule Take 1 Cap by mouth every 7 days. (Patient not taking: Reported on 9/22/2020) 12 Cap 0     No current facility-administered medications for this visit.      Social History     Tobacco Use   • Smoking status: Never Smoker   • Smokeless tobacco: Never Used   Substance Use Topics   • Alcohol use: No   • Drug use: Yes     Frequency: 7.0 times per week     Types: Marijuana     Comment: vapes at night      Family History   Problem Relation Age of Onset   • GI Disease Mother         IBS   • Cancer Father         mesothelioma   • Depression Father    • Hypertension Father    • Seizures Father         epilepsy       /76 (BP Location: Left arm, Patient Position: Sitting, BP Cuff Size: Adult)   Pulse 76   Temp 36.7 °C (98 °F) (Temporal)   Resp 12   Ht 1.676 m (5' 6\")   Wt 67.2 kg (148 lb 3.2 oz)   SpO2 " 96%  Body mass index is 23.92 kg/m².  Review of Systems   Constitutional: Negative for chills, fever and malaise/fatigue.   HENT: Negative for hearing loss and tinnitus.    Eyes: Negative for double vision and pain.   Respiratory: Negative for cough, shortness of breath and wheezing.    Cardiovascular: Negative for chest pain, palpitations and leg swelling.   Gastrointestinal: Negative for abdominal pain, diarrhea, nausea and vomiting.   Genitourinary: Negative for dysuria and frequency.   Musculoskeletal: Negative for joint pain and myalgias.   Skin: Negative for itching and rash.   Neurological: Negative for dizziness and headaches.     Physical Exam   Constitutional: She is oriented to person, place, and time and well-developed, well-nourished, and in no distress. No distress.   HENT:   Head: Normocephalic and atraumatic.   Right Ear: External ear normal.   Left Ear: External ear normal.   Eyes: Pupils are equal, round, and reactive to light. Conjunctivae are normal. Right eye exhibits no discharge. Left eye exhibits no discharge. No scleral icterus.   Cardiovascular: Normal rate, regular rhythm and normal heart sounds. Exam reveals no gallop and no friction rub.   No murmur heard.  Pulmonary/Chest: Effort normal and breath sounds normal. No respiratory distress. She has no wheezes. She has no rales.   Musculoskeletal:         General: No edema.      Comments: Bilateral knees wnl. No swelling. No pain with medial and lateral ligament stress. Negative ant/pos drawer sign. No tenderness behind the knees. No pain on exam b/l. Hand wnl no swelling erythema or increased warm or tenderness with manipulation.    Neurological: She is alert and oriented to person, place, and time.   Skin: Skin is warm and dry. She is not diaphoretic.         All labs (last 1 month):   No visits with results within 1 Month(s) from this visit.   Latest known visit with results is:   Hospital Outpatient Visit on 05/20/2020   Component Date  Value Ref Range Status   • SARS-CoV-2 Source 05/20/2020 Nasal Swab   Final   • SARS-CoV-2, KLAUS 05/20/2020 Not Detected  Not Detected Final    Comment: This test was developed and its performance characteristics determined  by [facility]. This test has not been FDA cleared or  approved. This test has been authorized by FDA under an Emergency Use  Authorization (EUA). This test is only authorized for the duration of  time the declaration that circumstances exist justifying the  authorization of the emergency use of in vitro diagnostic tests for  detection of SARS-CoV-2 virus and/or diagnosis of COVID-19 infection  under section 564(b)(1) of the Act, 21 U.S.C. 360bbb-3(b)(1), unless  the authorization is terminated or revoked sooner.  When diagnostic testing is negative, the possibility of a false  negative result should be considered in the context of a patient's  recent exposures and the presence of clinical signs and symptoms  consistent with COVID-19. An individual without symptoms of COVID-19  and who is not shedding SARS-CoV-2 virus would expect to have a  negative (not detected) result in this assay.         Lipids:   Lab Results   Component Value Date/Time    CHOLSTRLTOT 164 06/04/2019 07:55 AM    TRIGLYCERIDE 49 06/04/2019 07:55 AM    HDL 57 06/04/2019 07:55 AM    LDL 97 06/04/2019 07:55 AM       Imaging: No results found.    A/P  -reassurance provided for resolved joint pain  -continue Celexa I expect fatigue to resolve or improve  Return for annual.    Problem List Items Addressed This Visit     CHRISTOPH (generalized anxiety disorder)    Relevant Medications    citalopram (CELEXA) 20 MG Tab        Portions of this note may be dictated using Dragon NaturallySpeaking voice recognition software.  Variances in spelling and vocabulary are possible and unintentional.  Not all areas may be caught/corrected.  Please notify me if any discrepancies are noted or if the meaning of any statement is not  correct/clear.

## 2020-11-03 ENCOUNTER — OFFICE VISIT (OUTPATIENT)
Dept: MEDICAL GROUP | Facility: IMAGING CENTER | Age: 60
End: 2020-11-03
Payer: COMMERCIAL

## 2020-11-03 VITALS — WEIGHT: 140 LBS | HEIGHT: 66 IN | BODY MASS INDEX: 22.5 KG/M2

## 2020-11-03 DIAGNOSIS — Z12.31 ENCOUNTER FOR SCREENING MAMMOGRAM FOR BREAST CANCER: ICD-10-CM

## 2020-11-03 DIAGNOSIS — F41.1 GAD (GENERALIZED ANXIETY DISORDER): ICD-10-CM

## 2020-11-03 DIAGNOSIS — Z12.11 SCREENING FOR COLORECTAL CANCER: ICD-10-CM

## 2020-11-03 DIAGNOSIS — E55.9 VITAMIN D DEFICIENCY: ICD-10-CM

## 2020-11-03 DIAGNOSIS — Z13.1 SCREENING FOR DIABETES MELLITUS: ICD-10-CM

## 2020-11-03 DIAGNOSIS — Z13.0 SCREENING FOR DEFICIENCY ANEMIA: ICD-10-CM

## 2020-11-03 DIAGNOSIS — Z12.12 SCREENING FOR COLORECTAL CANCER: ICD-10-CM

## 2020-11-03 DIAGNOSIS — Z13.220 SCREENING FOR CHOLESTEROL LEVEL: ICD-10-CM

## 2020-11-03 DIAGNOSIS — Z11.59 NEED FOR HEPATITIS C SCREENING TEST: ICD-10-CM

## 2020-11-03 PROCEDURE — 99396 PREV VISIT EST AGE 40-64: CPT | Mod: 95,CR | Performed by: FAMILY MEDICINE

## 2020-11-03 RX ORDER — CITALOPRAM 20 MG/1
40 TABLET ORAL DAILY
Qty: 180 TAB | Refills: 1 | Status: SHIPPED | OUTPATIENT
Start: 2020-11-03 | End: 2021-01-06 | Stop reason: SDUPTHER

## 2020-11-03 ASSESSMENT — PAIN SCALES - GENERAL: PAINLEVEL: NO PAIN

## 2020-11-03 ASSESSMENT — FIBROSIS 4 INDEX: FIB4 SCORE: 1.74

## 2020-11-03 NOTE — PROGRESS NOTES
Subjective:     Telemedicine Visit: New Patient     This encounter was conducted via doxcimity.   Verbal consent was obtained. Patient's identity was verified. Patient aware this will be   billed the same as an in person evaluation.  Patient in home, I am in office at [address] [place] [address]    CC:   Chief Complaint   Patient presents with   • Annual Exam       HPI:   Edel Wagner is a 60 y.o. female who presents for annual exam. She is feeling well and denies any complaints.  She increased citalopram to 40mg with fatigue now improving.   10,000 u vit d daily. Her pcp recommended that she take this much.     Ob-Gyn/ History:    Patient has GYN provider:   Last Pap Smear:  2019 normal  Safe in relationship.     Health Maintenance  Aspirin Use: discussed reduced colon cancer risk. Reduced mi and stroke. Her risk score is quit low The 10-year ASCVD risk score (Kelvin GAR Jr., et al., 2013) is: 1.9%    Diet: moderately healthy  Exercise: active  Substance Abuse: none  Seat belts, bike helmet, gun safety discussed.  Sun protection used.    Cancer screening  Colorectal Cancer Screening: with family history of colon cancer  Lung Cancer Screening: n/a  Breast Cancer Screening: ordered    Infectious disease screening/Immunizations  --STI Screening: none today  --Practices safe sex.  --Immunizations:    refuses    She  has a past medical history of Anxiety.  She  has a past surgical history that includes dilation and curettage (2000).    Family History   Problem Relation Age of Onset   • GI Disease Mother         IBS   • Cancer Father         mesothelioma   • Depression Father    • Hypertension Father    • Seizures Father         epilepsy       Social History     Socioeconomic History   • Marital status:      Spouse name: Not on file   • Number of children: Not on file   • Years of education: Not on file   • Highest education level: Not on file   Occupational History   • Not on file   Social Needs   •  Financial resource strain: Not on file   • Food insecurity     Worry: Not on file     Inability: Not on file   • Transportation needs     Medical: Not on file     Non-medical: Not on file   Tobacco Use   • Smoking status: Never Smoker   • Smokeless tobacco: Never Used   Substance and Sexual Activity   • Alcohol use: No   • Drug use: Yes     Frequency: 7.0 times per week     Types: Marijuana   • Sexual activity: Not Currently   Lifestyle   • Physical activity     Days per week: Not on file     Minutes per session: Not on file   • Stress: Not on file   Relationships   • Social connections     Talks on phone: Not on file     Gets together: Not on file     Attends [employer] service: Not on file     Active member of club or organization: Not on file     Attends meetings of clubs or organizations: Not on file     Relationship status: Not on file   • Intimate partner violence     Fear of current or ex partner: Not on file     Emotionally abused: Not on file     Physically abused: Not on file     Forced sexual activity: Not on file   Other Topics Concern   • Not on file   Social History Narrative   • Not on file       Patient Active Problem List    Diagnosis Date Noted   • CHRISTOPH (generalized anxiety disorder) 09/22/2020   • Vitamin D deficiency 06/05/2019   • Hair loss 06/03/2019   • Anxiety 06/03/2019   • Insomnia 06/03/2019         Current Outpatient Medications   Medication Sig Dispense Refill   • Fexofenadine HCl (ALLEGRA PO) Take  by mouth.     • citalopram (CELEXA) 20 MG Tab Take 2 Tabs by mouth every day. 180 Tab 1   • Cholecalciferol (VITAMIN D3 PO) Take 10,000 Units by mouth every day.     • MAGNESIUM PO Take  by mouth.     • Multiple Vitamins-Minerals (ZINC PO) Take  by mouth.     • Multiple Vitamins-Minerals (MULTIVITAMIN WOMENS 50+ ADV PO) Take  by mouth.       No current facility-administered medications for this visit.      No Known Allergies    Review of Systems   Constitutional: Negative for fever, chills,  "unexplained weight loss, night sweats  HENT: Negative for congestion.    Eyes: Negative for pain or sudden vision changes   Respiratory: Negative for cough and shortness of breath.    Cardiovascular: Negative for leg swelling or chest pain  Gastrointestinal: Negative for nausea, vomiting, abdominal pain and diarrhea.   Genitourinary: Negative for dysuria and hematuria.   Skin: Negative for rash or concerning moles   Neurological: Negative for dizziness, focal weakness and headaches.   Psychiatric/Behavioral: Negative for depression.  The patient is not nervous/anxious.      Objective:     Ht 1.676 m (5' 6\")   Wt 63.5 kg (140 lb)   BMI 22.60 kg/m²   Body mass index is 22.6 kg/m².  Wt Readings from Last 4 Encounters:   20 63.5 kg (140 lb)   10/27/20 67.2 kg (148 lb 3.2 oz)   20 65.2 kg (143 lb 12.8 oz)   19 64.9 kg (143 lb)       Physical Exam:  Physical Exam   Constitutional: She is well-developed, well-nourished, and in no distress. No distress.   HENT:   Head: Normocephalic and atraumatic.   Eyes: Pupils are equal, round, and reactive to light. Conjunctivae and EOM are normal. Right eye exhibits no discharge. Left eye exhibits no discharge. No scleral icterus.   Neck: No thyromegaly present.   Pulmonary/Chest: Effort normal. No respiratory distress.   Abdominal: She exhibits no distension.   Musculoskeletal:         General: No edema.   Neurological: She is alert.   Skin: Skin is warm and dry. She is not diaphoretic.   Psychiatric: Affect and judgment normal.       Assessment and Plan:   -Recommend 3000 units vitamin D daily  No problems updated.  Problem List Items Addressed This Visit     Vitamin D deficiency    Relevant Orders    VITAMIN 1,25 DIHYDROXY    CHRISTOPH (generalized anxiety disorder)    Relevant Medications    citalopram (CELEXA) 20 MG Tab      Other Visit Diagnoses     Need for hepatitis C screening test        Relevant Orders    HCV Scrn ( 8215-5419 [employer])    Screening for " colorectal cancer        Relevant Orders    REFERRAL TO GI FOR COLONOSCOPY    Encounter for screening mammogram for breast cancer        Relevant Orders    MA-SCREENING MAMMO BILAT W/TOMOSYNTHESIS W/CAD    Screening for deficiency anemia        Relevant Orders    CBC WITH DIFFERENTIAL    Screening for diabetes mellitus        Relevant Orders    Comp Metabolic Panel    Screening for cholesterol level        Relevant Orders    Lipid Profile          Follow-up: PRN concerns and for annual screenings once per year    -Smoking cessation discussed if smoking and encouraged to come to office if quit and tempted or restarts smoking if pertinent to this patient. We discourage use of electronic smoking devises.   -Discussed healthy drinking habits if over 7 for females, 14 for males per week or more than 4 in one day.  -Discussed healthy eating habits, exercise, being physically active, healthy bmi below 25  -patient to provide ages of family members when they were diagnosed with cancer , especially breast and ovarian, pancreatic cancers.  -Reviewed pap history in female patients, if they have a gynecologist they are encouraged to follow up with that doctor for annual pelvic and breast exams. If they prefer to see me for women's health, I will perform pap smear with hpv dna testing, pelvic, and breast exam, these and male genital exams are always done in the presence of a medical assistant and with verbal permission from the patient.   -Colonoscopy history reviewed with those over 46yo or those with early family h/o colon cancer. If patient is due we provide them with various colon cancer screen modalities and relevant information to help the patient decide which is best for them.   -Mammograms recommended yearly for women over 41yo. Risks and benefits are reviewed and discussed with the patient and mammogram script provided.   -PSA discussed with males with family history of prostate cancer or those concerned. The decision  to order this test is made with the patient.   -If patient prescribed medicines then told to review package insert for any warnings, side effects, contra-indications and medication vs medication reactions.   -STD testing added to lab work due to patients age per guideline recommendations  -Patients screened for anxiety and depression. If positive screening patients are offered behavioral health services, medications, and tools to improve mood.   -to improve bone health take calcium and vitamin D, perform weight-bearing exercise, in addition we can discuss additional medications if needed including bisphosphonates, parathyroid hormone, and raloxifene.  Esophageal irritation can occur with bisphosphonate therapy this can be reduced by not laying down for 30 min after taking and taking with a full glass of water  -if wearing nail polish on toes or hands asked to rto if there are any dark brown or black areas under the nails  If lab tests ordered, then patient instructed to go to lab/location/plan  If imaging tests ordered, then patient instructed to go to radiology/location/plan  If medicines ordered, then patient instructed to go to pharmacy/location/plan  Health maintenance I reviewed both men and women's health maintenance  Leading causes of death are motor vehicle accidents, cardiovascular disease, malignant tumors, and HIV.   Breast and ovarian cancer mutation screening was suggested if there was an increased risk for the patient based on [place] scoring.   -A general visit to see the eye doctor every one to two years was thought appropriate. Dentist ever 6-12 months.  -Immunization suggestions: Tetanus shot every 10 years, Influenza immunization pneumococcal- anyone with chronic illnesses

## 2020-12-22 ENCOUNTER — TELEPHONE (OUTPATIENT)
Dept: MEDICAL GROUP | Facility: IMAGING CENTER | Age: 60
End: 2020-12-22

## 2020-12-22 NOTE — TELEPHONE ENCOUNTER
Message received from patient's daughter that pt has been having difficulty with her citalopram rx. Spoke with patient. She said that she has been picking up 20mg rx from [facility] and that she wasn't allowed to  another rx. States she will run out of her medication early because she has been taking 40mg daily. Notified pt that last rx was for 40mg tabs. Told her I will call pharmacy to make sure rx for 40mg was received. Pt inquiring if she can increase to 60mg daily. Notified pt she would have to follow up for a change in dose. Pt scheduled for 1/6/21.     Called pharmacy. Pt had previously picked up qty 60 of 20mg tabs for a 30 day supply. Pt has refills left on 40mg rx. [facility] will fill for patient.

## 2021-01-06 ENCOUNTER — TELEMEDICINE (OUTPATIENT)
Dept: MEDICAL GROUP | Facility: IMAGING CENTER | Age: 61
End: 2021-01-06
Payer: COMMERCIAL

## 2021-01-06 VITALS — WEIGHT: 140 LBS | HEIGHT: 66 IN | BODY MASS INDEX: 22.5 KG/M2

## 2021-01-06 DIAGNOSIS — F41.1 GAD (GENERALIZED ANXIETY DISORDER): ICD-10-CM

## 2021-01-06 PROCEDURE — 99213 OFFICE O/P EST LOW 20 MIN: CPT | Mod: 95,CR | Performed by: FAMILY MEDICINE

## 2021-01-06 RX ORDER — CITALOPRAM 20 MG/1
60 TABLET ORAL DAILY
Qty: 270 TAB | Refills: 3 | Status: SHIPPED | OUTPATIENT
Start: 2021-01-06 | End: 2021-01-20

## 2021-01-06 ASSESSMENT — PATIENT HEALTH QUESTIONNAIRE - PHQ9
5. POOR APPETITE OR OVEREATING: 0 - NOT AT ALL
CLINICAL INTERPRETATION OF PHQ2 SCORE: 1
SUM OF ALL RESPONSES TO PHQ QUESTIONS 1-9: 8

## 2021-01-06 ASSESSMENT — FIBROSIS 4 INDEX: FIB4 SCORE: 1.74

## 2021-01-06 NOTE — PROGRESS NOTES
Telemedicine Visit: New Patient     This encounter was conducted via Issuu.   Verbal consent was obtained. Patient's identity was verified. Patient aware this will be   billed the same as an in person evaluation.  Patient in home, I am in office at [address] [place] [address]  Subjective:     Chief Complaint   Patient presents with   • Medication Management     would like to increase to 60 mg        Edel Wagner is a 60 y.o. female with history of miladis on virtual visit to discuss continued anxiety. No issues with the medication.  Patient reports that she has been on 60 mg in the past and it did help her.  Patient reports that Celexa 40 mg was working for her prior and it had improved her sleep.  However the last week she just has not slept at all.  She denies any suicidal or homicidal thoughts.    ROS  See HPI  Constitutional: Negative for fever, chills and malaise/fatigue.   HENT: Negative for congestion, itchy watery eyes  Eyes: Negative for pain or sudden changes in vision  Respiratory: Negative for cough and shortness of breath.    Cardiovascular: Negative for leg swelling or chest pain  Gastrointestinal: Negative for nausea, vomiting, abdominal pain and diarrhea.   Genitourinary: Negative for dysuria and hematuria.   Skin: Negative for rash or concerning moles   Neurological: Negative for dizziness, focal weakness   Psychiatric/Behavioral: Negative for depression.  With anxiety  Musculoskeletal: no weakness or joint stiffness    No Known Allergies    Current medicines (including changes today)  Current Outpatient Medications   Medication Sig Dispense Refill   • citalopram (CELEXA) 20 MG Tab Take 2 Tabs by mouth every day. 180 Tab 1   • Cholecalciferol (VITAMIN D3 PO) Take 10,000 Units by mouth every day.     • MAGNESIUM PO Take  by mouth.     • Multiple Vitamins-Minerals (ZINC PO) Take  by mouth.     • Multiple Vitamins-Minerals (MULTIVITAMIN WOMENS 50+ ADV PO) Take  by mouth.     • Fexofenadine  "HCl (ALLEGRA PO) Take  by mouth.       No current facility-administered medications for this visit.        She  has a past medical history of Anxiety.  She  has a past surgical history that includes dilation and curettage ().      Family History   Problem Relation Age of Onset   • GI Disease Mother         IBS   • Cancer Father         mesothelioma   • Depression Father    • Hypertension Father    • Seizures Father         epilepsy     Family Status   Relation Name Status   • Mo  Alive   • Fa         Patient Active Problem List    Diagnosis Date Noted   • CHRISTOPH (generalized anxiety disorder) 2020   • Vitamin D deficiency 2019   • Hair loss 2019   • Anxiety 2019   • Insomnia 2019          Objective:   Vitals obtained by patient:  Ht 1.676 m (5' 6\")   Wt 63.5 kg (140 lb)   BMI 22.60 kg/m²     Physical Exam:  Constitutional: Alert, no distress, well-groomed.  Skin: No rashes in visible areas.  Eye: Round. Conjunctiva clear, lids normal. No icterus.   ENMT: Lips pink without lesions, good dentition, moist mucous membranes. Phonation normal.  Neck: No masses, no thyromegaly. Moves freely without pain.  CV: Pulse as reported by patient  Respiratory: Unlabored respiratory effort, no cough or audible wheeze  Psych: Alert and oriented x3, normal affect and mood.   Neuro: symmetric face. Alert and oriented. Follows commands. No aphasia or dysarthria.    Labs:  No visits with results within 1 Month(s) from this visit.   Latest known visit with results is:   Hospital Outpatient Visit on 2020   Component Date Value Ref Range Status   • SARS-CoV-2 Source 2020 Nasal Swab   Final   • SARS-CoV-2, KLAUS 2020 Not Detected  Not Detected Final    Comment: This test was developed and its performance characteristics determined  by [employer]. This test has not been FDA cleared or  approved. This test has been authorized by FDA under an Emergency Use  Authorization (EUA). " This test is only authorized for the duration of  time the declaration that circumstances exist justifying the  authorization of the emergency use of in vitro diagnostic tests for  detection of SARS-CoV-2 virus and/or diagnosis of COVID-19 infection  under section 564(b)(1) of the Act, 21 U.S.C. 360bbb-3(b)(1), unless  the authorization is terminated or revoked sooner.  When diagnostic testing is negative, the possibility of a false  negative result should be considered in the context of a patient's  recent exposures and the presence of clinical signs and symptoms  consistent with COVID-19. An individual without symptoms of COVID-19  and who is not shedding SARS-CoV-2 virus would expect to have a  negative (not detected) result in this assay.         Imaging:   No results found.    Assessment and Plan:   The following treatment plan was discussed:   Increase Shira to 60 mg daily.  Discussed normal dose 40 mg max.  Patient is okay and reports tolerating medication at that dose in the past.  Patient to return to office in about a month if still having insomnia.  Problem List Items Addressed This Visit     CHRISTOPH (generalized anxiety disorder)          Follow-up: Return in about 1 month (around 2/6/2021).        Portions of this note may be dictated using Dragon NaturallySpeaking voice recognition software.  Variances in spelling and vocabulary are possible and unintentional.  Not all areas may be caught/corrected.  Please notify me if any discrepancies are noted or if the meaning of any statement is not correct/clear.

## 2021-01-18 ENCOUNTER — TELEMEDICINE (OUTPATIENT)
Dept: MEDICAL GROUP | Facility: IMAGING CENTER | Age: 61
End: 2021-01-18
Payer: COMMERCIAL

## 2021-01-18 VITALS — HEIGHT: 66 IN | BODY MASS INDEX: 22.5 KG/M2 | WEIGHT: 140 LBS

## 2021-01-18 DIAGNOSIS — K29.00 OTHER ACUTE GASTRITIS WITHOUT HEMORRHAGE: ICD-10-CM

## 2021-01-18 PROCEDURE — 99213 OFFICE O/P EST LOW 20 MIN: CPT | Mod: 95,CR | Performed by: FAMILY MEDICINE

## 2021-01-18 ASSESSMENT — PAIN SCALES - GENERAL: PAINLEVEL: NO PAIN

## 2021-01-18 ASSESSMENT — FIBROSIS 4 INDEX: FIB4 SCORE: 1.74

## 2021-01-19 NOTE — PROGRESS NOTES
Telemedicine Visit: New Patient     This encounter was conducted via Zoom.   Verbal consent was obtained. Patient's identity was verified. Patient aware this will be   billed the same as an in person evaluation.  Patient in home, I am in office at [address] [place] [address]  Subjective:     Chief Complaint   Patient presents with   • Diarrhea     x 3 day, with nausea    • Abdominal Cramping   • Fatigue     sleeping well    • Headache       Edelsa SARIAH Wagner is a 60 y.o. female with history of miladis, insomnia on virtual visit to discuss recent illness.   Diarrhea, nausea, stomach cramps, fatigue and headaches for 3 days now. Has been able to eat and drink gingerale. Eating pretzels. The nausea has improved as of today. Today her bm was solid. Will need a work note for sat/sun/mon. No loss of taste or smell, no fever, cough, or sob. She thinks it might have been the milk in a coffee that she had that morning. Felt subjective fever 3 nights ago.     ROS  See HPI  Constitutional: Negative for fever, chills and with malaise/fatigue.   HENT: Negative for congestion, itchy watery eyes  Eyes: Negative for pain or sudden changes in vision  Respiratory: Negative for cough and shortness of breath.    Cardiovascular: Negative for leg swelling or chest pain  Gastrointestinal: no nausea, vomiting, abdominal pain or diarrhea as of today.   Genitourinary: Negative for dysuria and hematuria.   Skin: Negative for rash or concerning moles   Neurological: Negative for dizziness, focal weakness   Psychiatric/Behavioral: Negative for depression.  The patient is not nervous/anxious.    Musculoskeletal: no weakness or joint stiffness    No Known Allergies    Current medicines (including changes today)  Current Outpatient Medications   Medication Sig Dispense Refill   • citalopram (CELEXA) 20 MG Tab Take 3 Tabs by mouth every day. 270 Tab 3   • Fexofenadine HCl (ALLEGRA PO) Take  by mouth.     • Cholecalciferol (VITAMIN D3 PO) Take  "10,000 Units by mouth every day.     • MAGNESIUM PO Take  by mouth.     • Multiple Vitamins-Minerals (ZINC PO) Take  by mouth.     • Multiple Vitamins-Minerals (MULTIVITAMIN WOMENS 50+ ADV PO) Take  by mouth.       No current facility-administered medications for this visit.        She  has a past medical history of Anxiety.  She  has a past surgical history that includes dilation and curettage ().      Family History   Problem Relation Age of Onset   • GI Disease Mother         IBS   • Cancer Father         mesothelioma   • Depression Father    • Hypertension Father    • Seizures Father         epilepsy     Family Status   Relation Name Status   • Mo  Alive   • Fa         Patient Active Problem List    Diagnosis Date Noted   • CHRISTOPH (generalized anxiety disorder) 2020   • Vitamin D deficiency 2019   • Hair loss 2019   • Anxiety 2019   • Insomnia 2019          Objective:   Vitals obtained by patient:  Ht 1.676 m (5' 6\")   Wt 63.5 kg (140 lb)   BMI 22.60 kg/m²     Physical Exam:  Constitutional: Alert, no distress, well-groomed.  Skin: No rashes in visible areas.  Eye: Round. Conjunctiva clear, lids normal. No icterus.   ENMT: Lips pink without lesions, good dentition, moist mucous membranes. Phonation normal.  Neck: No masses, no thyromegaly. Moves freely without pain.  CV: Pulse as reported by patient  Respiratory: Unlabored respiratory effort, no cough or audible wheeze  Psych: Alert and oriented x3, normal affect and mood.   Neuro: symmetric face. Alert and oriented. Follows commands. No aphasia or dysarthria.    Labs:  No visits with results within 1 Month(s) from this visit.   Latest known visit with results is:   Hospital Outpatient Visit on 2020   Component Date Value Ref Range Status   • SARS-CoV-2 Source 2020 Nasal Swab   Final   • SARS-CoV-2, KLAUS 2020 Not Detected  Not Detected Final    Comment: This test was developed and its performance " characteristics determined  by [employer]. This test has not been FDA cleared or  approved. This test has been authorized by FDA under an Emergency Use  Authorization (EUA). This test is only authorized for the duration of  time the declaration that circumstances exist justifying the  authorization of the emergency use of in vitro diagnostic tests for  detection of SARS-CoV-2 virus and/or diagnosis of COVID-19 infection  under section 564(b)(1) of the Act, 21 U.S.C. 360bbb-3(b)(1), unless  the authorization is terminated or revoked sooner.  When diagnostic testing is negative, the possibility of a false  negative result should be considered in the context of a patient's  recent exposures and the presence of clinical signs and symptoms  consistent with COVID-19. An individual without symptoms of COVID-19  and who is not shedding SARS-CoV-2 virus would expect to have a  negative (not detected) result in this assay.         Imaging:   No results found.    Assessment and Plan:   The following treatment plan was discussed:   -Does not want nausea meds now. Will message me if nausea lingers.   -she is able to hydrate and eat  -symptoms have resolved mostly as of today  Problem List Items Addressed This Visit     None      Visit Diagnoses     Other acute gastritis without hemorrhage              Follow-up: Return if symptoms worsen or fail to improve.        Portions of this note may be dictated using Dragon NaturallySpeaking voice recognition software.  Variances in spelling and vocabulary are possible and unintentional.  Not all areas may be caught/corrected.  Please notify me if any discrepancies are noted or if the meaning of any statement is not correct/clear.

## 2021-01-20 ENCOUNTER — TELEPHONE (OUTPATIENT)
Dept: MEDICAL GROUP | Facility: IMAGING CENTER | Age: 61
End: 2021-01-20

## 2021-01-20 DIAGNOSIS — F41.1 GAD (GENERALIZED ANXIETY DISORDER): ICD-10-CM

## 2021-01-20 RX ORDER — DULOXETIN HYDROCHLORIDE 30 MG/1
60 CAPSULE, DELAYED RELEASE ORAL DAILY
Qty: 180 CAP | Refills: 1 | Status: SHIPPED | OUTPATIENT
Start: 2021-01-20 | End: 2021-02-24

## 2021-01-20 NOTE — TELEPHONE ENCOUNTER
----- Message from Edel Wagner sent at 1/19/2021  9:45 AM PST -----  Regarding: Non-Urgent Medical Question  Contact: [phone]  Good morning,   Where will I find the doctors excuse for Saturday 16 thru Monday 18?  Thank you for all you do,   Tricia Wagner  [phone]

## 2021-01-20 NOTE — TELEPHONE ENCOUNTER
Received message from patient requesting letter to excuse her from work. She states Dr. Salmeron was suppose to provide her a letter. Patient is requesting this ASAP as her employer is waiting.

## 2021-01-20 NOTE — TELEPHONE ENCOUNTER
Received call from [facility] pharmacy requesting new prescription for patients citalopram. Pharmacy is requesting 40 mg tabs as well as 20 mg to equal patients dose of 60 mg. They are requesting this as it may be covered at a better price through patients insurance.

## 2021-02-24 ENCOUNTER — TELEPHONE (OUTPATIENT)
Dept: MEDICAL GROUP | Facility: IMAGING CENTER | Age: 61
End: 2021-02-24

## 2021-02-24 DIAGNOSIS — F41.1 GAD (GENERALIZED ANXIETY DISORDER): ICD-10-CM

## 2021-02-24 RX ORDER — CITALOPRAM 20 MG/1
20 TABLET ORAL DAILY
Qty: 90 TABLET | Refills: 1 | Status: SHIPPED | OUTPATIENT
Start: 2021-02-24 | End: 2021-10-20

## 2021-02-24 RX ORDER — CITALOPRAM 40 MG/1
40 TABLET ORAL DAILY
Qty: 90 TABLET | Refills: 1 | Status: SHIPPED | OUTPATIENT
Start: 2021-02-24 | End: 2021-10-20

## 2021-02-24 NOTE — TELEPHONE ENCOUNTER
Received call from [facility] pharmacy regarding patients prescriptions. [facility] shows Dr. Salmeron canceled patients citalopram and ordered duloxetine. Patient noted she is not taking duloxetine.   Spoke with Dr. Salmeron and prescription should be for citalopram, taking 60 mg daily. New prescription sent in by Dr. Salmeron. Verified with [facility] they received new prescription and duloxetine was canceled. Also notified patient.

## 2021-09-13 ENCOUNTER — TELEMEDICINE (OUTPATIENT)
Dept: MEDICAL GROUP | Facility: IMAGING CENTER | Age: 61
End: 2021-09-13
Payer: COMMERCIAL

## 2021-09-13 VITALS — WEIGHT: 147 LBS | HEART RATE: 60 BPM | BODY MASS INDEX: 23.63 KG/M2 | HEIGHT: 66 IN

## 2021-09-13 DIAGNOSIS — A08.4 VIRAL GASTROENTERITIS: ICD-10-CM

## 2021-09-13 PROCEDURE — 99213 OFFICE O/P EST LOW 20 MIN: CPT | Mod: 95 | Performed by: CLINICAL NURSE SPECIALIST

## 2021-09-13 ASSESSMENT — ENCOUNTER SYMPTOMS
DIARRHEA: 1
NAUSEA: 1
HEADACHES: 1
CHILLS: 1
ABDOMINAL PAIN: 0
COUGH: 1
VOMITING: 0
DIAPHORESIS: 1
SHORTNESS OF BREATH: 0
EYE PAIN: 0
DIZZINESS: 0
MYALGIAS: 1
SORE THROAT: 0
BLURRED VISION: 0

## 2021-09-13 ASSESSMENT — PAIN SCALES - GENERAL: PAINLEVEL: NO PAIN

## 2021-09-13 NOTE — PROGRESS NOTES
Telemedicine: Established Patient   This evaluation was conducted via Zoom using secure and encrypted videoconferencing technology. The patient was in a private location in the state of [place].    The patient's identity was confirmed and verbal consent was obtained for this virtual visit.    Subjective:   CC:   Chief Complaint   Patient presents with   • Nausea     x 3 days    • Fatigue     lethargic x 3 days    • Other     clamy feeling   • Headache       HPI:  Illness x3 days, fatigued and weak.  Feels cold and clammy.  Unknown if fever.  Has had nausea and diarrhea, diarrhea improved today.  No change in taste or smell. No neuropathy or change in skin color.  Otalgia x1 day resolved.  No thermometer or BP cuff at home,  will bring home thermometer tonight.  No lymphadenopathy.  Able to eat once a day, sandwich.  Drinking Pepsi and KoolAid.  No water.  Children were sick last week, interacted with them last week.  Unknown if similar symptoms. No raw meat exposure.  Took ibuprofen and DayQuil and this did not help her symptoms.      Review of Systems   Constitutional: Positive for chills, diaphoresis (night) and malaise/fatigue.   HENT: Negative for congestion and sore throat.    Eyes: Negative for blurred vision and pain.   Respiratory: Positive for cough (started today, dry). Negative for shortness of breath.    Cardiovascular: Negative for chest pain.   Gastrointestinal: Positive for diarrhea (3 days, improved) and nausea. Negative for abdominal pain, melena and vomiting.   Genitourinary: Negative for dysuria and hematuria.   Musculoskeletal: Positive for joint pain (3 weeks) and myalgias.   Skin: Negative for rash.   Neurological: Positive for headaches (moving). Negative for dizziness.       No Known Allergies    Current medicines (including changes today)  Current Outpatient Medications   Medication Sig Dispense Refill   • citalopram (CELEXA) 20 MG Tab Take 1 tablet by mouth every day. 90 tablet 1   •  citalopram (CELEXA) 40 MG Tab Take 1 tablet by mouth every day. 90 tablet 1   • Fexofenadine HCl (ALLEGRA PO) Take  by mouth. (Patient not taking: Reported on 9/13/2021)     • Cholecalciferol (VITAMIN D3 PO) Take 10,000 Units by mouth every day. (Patient not taking: Reported on 9/13/2021)     • MAGNESIUM PO Take  by mouth. (Patient not taking: Reported on 9/13/2021)     • Multiple Vitamins-Minerals (ZINC PO) Take  by mouth. (Patient not taking: Reported on 9/13/2021)     • Multiple Vitamins-Minerals (MULTIVITAMIN WOMENS 50+ ADV PO) Take  by mouth. (Patient not taking: Reported on 9/13/2021)       No current facility-administered medications for this visit.       Patient Active Problem List    Diagnosis Date Noted   • CHRISTOPH (generalized anxiety disorder) 09/22/2020   • Vitamin D deficiency 06/05/2019   • Hair loss 06/03/2019   • Anxiety 06/03/2019   • Insomnia 06/03/2019         Family History   Problem Relation Age of Onset   • GI Disease Mother         IBS   • Cancer Father         mesothelioma   • Depression Father    • Hypertension Father    • Seizures Father         epilepsy       She  has a past medical history of Anxiety.  She  has a past surgical history that includes dilation and curettage (2000).       Objective:     Physical Exam     Vitals obtained by patient: pulse 60       Constitutional: Alert, no distress, well-groomed.  Skin: No rashes in visible areas.  Eye: Round. Conjunctiva clear, lids normal. No icterus.   ENMT: Lips pink without lesions, good dentition, moist mucous membranes. Phonation normal.  Neck: No masses, no thyromegaly. Moves freely without pain.  CV: Pulse as reported by patient  Respiratory: Unlabored respiratory effort, no cough or audible wheeze  Psych: Alert and oriented x4, normal affect and mood.     Assessment and Plan:   The following treatment plan was discussed:     Viral gastroenteritis.    Monitor your temperature  Drink fluids, jany/honey/lemon tea. Pedialyte  May take  ibuprofen 600mg every 6 hours as needed  If use Tylenol, less than 3000mg/day  Rest as needed  Warm bath with Epsom Salt  Eat as tolerated    Work excuse note sent.     Follow-up: Return if symptoms worsen or fail to improve.

## 2021-09-13 NOTE — PATIENT INSTRUCTIONS
Gastroenteritis viral en los adultos  Viral Gastroenteritis, Adult    La gastroenteritis viral también se conoce bisi gripe estomacal. Esta afección podría afectarle el estómago, el intestino laughlin y el intestino grueso. Puede provocar heces acuosas (diarrea), fiebre y vómitos de manera repentina. Esta afección es el resultado de determinados microbios (virus). Estos microbios pueden transmitirse de kimmie persona a otra con mucha facilidad (son contagiosos).  La diarrea y los vómitos pueden hacer que se sienta débil y que no tenga kimmie cantidad suficiente de agua en el cuerpo (se deshidrate). [place] puede hacerlo sentir cansado y sediento, producirle sequedad en la boca y disminuir la frecuencia con la que orina. Es importante restituir los líquidos que se pierden a causa de la diarrea y los vómitos.  ¿Cuáles son las causas?  · Usted puede enfermarse al contagiarse virus de otras personas.  · También puede enfermarse de las siguientes maneras:  ? [place] alimentos, beber agua o tocar kimmie superficie que tengan los virus (están contaminados).  ? Compartir utensilios u otros artículos personales con kimmie persona enferma.  ¿Qué incrementa el riesgo?  · Tener debilitamiento del sistema de defensa del cuerpo (sistema inmunitario).  · Vivir con katharina o más niños menores de 2 años.  · Vivir en un hogar de ancianos.  · Viajar en cruceros.  ¿Cuáles son los signos o los síntomas?  Los síntomas de esta afección aparecen repentinamente. Pueden durar algunos días o incluso kimmie semana.  · Los síntomas frecuentes incluyen los siguientes:  ? Heces acuosas.  ? Tiene vómitos.  · Otros síntomas pueden incluir los siguientes:  ? Fiebre.  ? Dolor de tracie.  ? Sensación de cansancio (fatiga).  ? Dolor de vientre (abdomen).  ? Escalofríos.  ? Sensación de debilidad.  ? Ganas de vomitar (náuseas).  ? Pao musculares.  ? Falta de apetito.  ¿Cómo se trata?  · Por lo general, esta afección desaparece por sí jordan.  · El objetivo del tratamiento es  reponer los líquidos que se pierden. El tratamiento de esta afección puede incluir:  ? [place] SRO (solución de rehidratación oral). Es kimmie bebida que se vende en farmacias y tiendas.  ? Medicamentos para aliviar los síntomas.  ? Suplementos probióticos para disminuir los síntomas de diarrea.  ? Recibir líquidos por un tubo (catéter) intravenoso si es necesario.  · Los adultos mayores y las personas que tienen otras enfermedades o que tienen debilitado el sistema de defensa del cuerpo corren un mayor riesgo al no tener kimmie cantidad suficiente de agua del cuerpo.  Siga estas instrucciones en peterson casa:  Comida y bebida    · [place] kimmie SRO bisi se lo haya indicado el médico.  · En la medida en que pueda, jyotsna líquidos consuelo en pequeñas cantidades. Los líquidos transparentes son, por ejemplo:  ? Agua.  ? Trocitos de hielo.  ? Jugo de frutas con agua agregada (diluido).  ? Bebidas deportivas de bajas calorías.  · [place]yotsna suficiente líquido para mantener el pis (la orina) de color amarillo pálido.  · Coma pequeñas cantidades de alimentos saludables cada 3 a 4 horas según peterson tolerancia. Estos pueden incluir cereales integrales, frutas, verduras, kevin magras y yogur.  · Evite consumir líquidos que tengan mucha azúcar o cafeína, bisi bebidas energéticas, bebidas deportivas y refrescos.  · Evite los alimentos condimentados o con alto contenido de grasa.  · Evite pedro alcohol.  Instrucciones generales    · Lávese las mile con frecuencia. [place] es muy importante después de hacer heces acuosas o vomitar. Use un desinfectante para mile si no dispone de agua y jabón.  · Asegúrese de que todas las personas que viven en peterson casa se laven stephen las mile y con frecuencia.  · [place] los medicamentos de venta toribio y los recetados solamente bisi se lo haya indicado el médico.  · Descanse en peterson casa hasta sentirse mejor.  · Controle peterson afección para detectar cualquier cambio.  · [place] un baño caliente para ayudar a disminuir el ardor o el dolor  que produce la diarrea.  · Concurra a todas las visitas de seguimiento bisi se lo haya indicado el médico. [place] es importante.  Comuníquese con un médico si:  · No puede retener los líquidos.  · [place] síntomas empeoran.  · Aparecen nuevos síntomas.  · Siente que va a desvanecerse.  · Siente mareos.  · Tiene calambres musculares.  Solicite ayuda inmediatamente si:  · Siente dolor en el pecho.  · Se siente muy débil.  · Pierde el conocimiento (se desmaya).  · Observa diane en el vómito.  · El vómito se asemeja al poso del café.  · Tiene deposiciones (heces) con diane o de color dayton, o con aspecto alquitranado.  · Siente un dolor de tracie intenso, rigidez en el alberto, o ambas cosas.  · Tiene kimmie erupción cutánea.  · Tiene dolor muy intenso en el vientre, cólicos o meteorismo.  · Tiene dificultad para respirar.  · Respira muy rápido.  · Tiene latidos cardíacos acelerados.  · Siente la piel fría y húmeda.  · Se siente desorientado (confundido).  · Siente dolor al orinar.  · Presenta signos de no tener suficiente agua en el cuerpo, por ejemplo:  ? La orina es oscura, es muy escasa o no orina.  ? Labios agrietados.  ? Sequedad de boca.  ? Ojos hundidos.  ? Sentirse muy somnoliento.  ? Sensación de debilidad.  Resumen  · La gastroenteritis viral también se conoce bisi gripe estomacal.  · Esta afección puede provocar heces líquidas (diarrea), fiebre y vómitos de forma repentina.  · Estos microbios pueden transmitirse de kimmie persona a otra con mucha facilidad.  · [place] kimmie SRO bisi se lo haya indicado el médico. Es kimmie bebida que se vende en farmacias y tiendas.  · En la medida en que pueda, kelsie líquidos en pequeñas cantidades varias veces al día.  Esta información no tiene bisi fin reemplazar el consejo del médico. Asegúrese de hacerle al médico cualquier pregunta que tenga.  Document Released: 05/06/2010 Document Revised: 11/29/2019 Document Reviewed: 11/29/2019  Elsevier Patient Education © 2020 [employer] Inc[employer]

## 2021-10-20 DIAGNOSIS — F41.1 GAD (GENERALIZED ANXIETY DISORDER): ICD-10-CM

## 2021-10-20 RX ORDER — CITALOPRAM 40 MG/1
TABLET ORAL
Qty: 30 TABLET | Refills: 0 | Status: SHIPPED | OUTPATIENT
Start: 2021-10-20 | End: 2023-03-30

## 2021-10-20 RX ORDER — CITALOPRAM 20 MG/1
TABLET ORAL
Qty: 90 TABLET | Refills: 0 | Status: SHIPPED | OUTPATIENT
Start: 2021-10-20 | End: 2023-03-30

## 2022-01-21 ENCOUNTER — TELEPHONE (OUTPATIENT)
Dept: MEDICAL GROUP | Facility: IMAGING CENTER | Age: 62
End: 2022-01-21

## 2022-05-04 ENCOUNTER — HOSPITAL ENCOUNTER (OUTPATIENT)
Facility: MEDICAL CENTER | Age: 62
End: 2022-05-04
Attending: NURSE PRACTITIONER

## 2022-05-04 ENCOUNTER — OFFICE VISIT (OUTPATIENT)
Dept: URGENT CARE | Facility: PHYSICIAN GROUP | Age: 62
End: 2022-05-04

## 2022-05-04 VITALS
OXYGEN SATURATION: 97 % | SYSTOLIC BLOOD PRESSURE: 130 MMHG | RESPIRATION RATE: 12 BRPM | TEMPERATURE: 97.7 F | WEIGHT: 155 LBS | BODY MASS INDEX: 24.91 KG/M2 | HEART RATE: 54 BPM | HEIGHT: 66 IN | DIASTOLIC BLOOD PRESSURE: 74 MMHG

## 2022-05-04 DIAGNOSIS — Z20.822 SUSPECTED COVID-19 VIRUS INFECTION: ICD-10-CM

## 2022-05-04 DIAGNOSIS — J06.9 UPPER RESPIRATORY TRACT INFECTION, UNSPECIFIED TYPE: ICD-10-CM

## 2022-05-04 DIAGNOSIS — R68.89 FLU-LIKE SYMPTOMS: ICD-10-CM

## 2022-05-04 PROCEDURE — 99213 OFFICE O/P EST LOW 20 MIN: CPT | Mod: CS | Performed by: NURSE PRACTITIONER

## 2022-05-04 PROCEDURE — 0240U HCHG SARS-COV-2 COVID-19 NFCT DS RESP RNA 3 TRGT MIC: CPT

## 2022-05-04 ASSESSMENT — ENCOUNTER SYMPTOMS
VOMITING: 0
RHINORRHEA: 1
ABDOMINAL PAIN: 0
FEVER: 1
COUGH: 1
CHILLS: 1
HEADACHES: 1
SORE THROAT: 0
MYALGIAS: 1
DIARRHEA: 1
NAUSEA: 1

## 2022-05-04 NOTE — LETTER
May 4, 2022    To Whom It May Concern:         This is confirmation that Edel Wagner attended her scheduled appointment with TEX Lubin on 5/04/22.  Please excuse her absence from 4/30-5/3/2022 due to an acute illness.        If you have any questions please do not hesitate to call me at the phone number listed below.    Sincerely,          ELAINE Lubin.  [phone]

## 2022-05-04 NOTE — PROGRESS NOTES
Subjective:     Edel Wagner is a 62 y.o. female who presents for Headache (Started Friday Headache, fever, ear pain, nausea)      Headache   This is a new problem. The current episode started in the past 7 days (Edel is a pleasant 62 year old female who presents to  today with complaints of flu like symptoms that started 6 days ago. ). Associated symptoms include coughing (productive), a fever, muscle aches, nausea and rhinorrhea. Pertinent negatives include no abdominal pain, sore throat or vomiting. Associated symptoms comments: Diarrhea, headaches, subjective fever an chills. . She has tried acetaminophen for the symptoms. The treatment provided mild relief.   Pain level 5/10.       Review of Systems   Constitutional: Positive for chills, fever and malaise/fatigue.   HENT: Positive for congestion and rhinorrhea. Negative for sore throat.    Respiratory: Positive for cough (productive).    Gastrointestinal: Positive for diarrhea and nausea. Negative for abdominal pain and vomiting.   Musculoskeletal: Positive for myalgias.   Neurological: Positive for headaches.       PMH:   Past Medical History:   Diagnosis Date   • Anxiety      ALLERGIES: No Known Allergies  SURGHX:   Past Surgical History:   Procedure Laterality Date   • DILATION AND CURETTAGE  2000     SOCHX:   Social History     Socioeconomic History   • Marital status:    Tobacco Use   • Smoking status: Never Smoker   • Smokeless tobacco: Never Used   Vaping Use   • Vaping Use: Never used   Substance and Sexual Activity   • Alcohol use: No   • Drug use: Not Currently     Frequency: 7.0 times per week     Types: Marijuana   • Sexual activity: Not Currently     FH:   Family History   Problem Relation Age of Onset   • GI Disease Mother         IBS   • Cancer Father         mesothelioma   • Depression Father    • Hypertension Father    • Seizures Father         epilepsy         Objective:   /74 (BP Location: Left arm, Patient Position:  "Sitting, BP Cuff Size: Adult)   Pulse (!) 54   Temp 36.5 °C (97.7 °F) (Temporal)   Resp 12   Ht 1.676 m (5' 6\")   Wt 70.3 kg (155 lb)   SpO2 97%   BMI 25.02 kg/m²     Physical Exam  Vitals and nursing note reviewed.   Constitutional:       General: She is not in acute distress.     Appearance: Normal appearance. She is ill-appearing.   HENT:      Head: Normocephalic and atraumatic.      Right Ear: Tympanic membrane, ear canal and external ear normal. There is no impacted cerumen.      Left Ear: Tympanic membrane, ear canal and external ear normal. There is no impacted cerumen.      Nose: Congestion present. No rhinorrhea.      Mouth/Throat:      Mouth: Mucous membranes are moist.      Pharynx: Posterior oropharyngeal erythema present. No oropharyngeal exudate.   Eyes:      Extraocular Movements: Extraocular movements intact.      Pupils: Pupils are equal, round, and reactive to light.   Cardiovascular:      Rate and Rhythm: Normal rate and regular rhythm.      Pulses: Normal pulses.      Heart sounds: Normal heart sounds.   Pulmonary:      Effort: Pulmonary effort is normal. No respiratory distress.      Breath sounds: Normal breath sounds. No stridor. No wheezing, rhonchi or rales.   Chest:      Chest wall: No tenderness.   Abdominal:      General: Abdomen is flat. Bowel sounds are normal.      Palpations: Abdomen is soft.      Tenderness: There is no abdominal tenderness. There is no right CVA tenderness or left CVA tenderness.   Musculoskeletal:         General: Normal range of motion.      Cervical back: Normal range of motion and neck supple. Tenderness present.   Lymphadenopathy:      Cervical: Cervical adenopathy present.   Skin:     General: Skin is warm and dry.      Capillary Refill: Capillary refill takes less than 2 seconds.   Neurological:      General: No focal deficit present.      Mental Status: She is alert and oriented to person, place, and time. Mental status is at baseline.   Psychiatric:  "        Mood and Affect: Mood normal.         Behavior: Behavior normal.         Thought Content: Thought content normal.         Judgment: Judgment normal.         Assessment/Plan:   Assessment    1. Suspected COVID-19 virus infection  CANCELED: POCT SARS-COV Antigen JED (Symptomatic only)   2. Flu-like symptoms  CoV-2 and Flu A/B by PCR (24 hour In-House): Collect NP swab in VTM    CANCELED: POCT SARS-COV Antigen JED (Symptomatic only)   3. Upper respiratory tract infection, unspecified type  CANCELED: POCT SARS-COV Antigen JED (Symptomatic only)     We discussed supportive measures including humidifier, warm salt water gargles, over-the-counter Cepacol throat lozenges, rest  and increased fluids. Pt was encouraged to seek treatment back in the ER or urgent care for worsening symptoms,  fever greater than 100.5, wheezes or shortness of breath.  Discussed with patient symptoms are viral in nature, there is low concern for any respiratory infection currently. Antibiotics are not advised at this time.  Encouraged to use OTC nasal spray with steroid for three days to help relieve symptoms as well as sudafed, warm facial compresses, humidifier, and increase fluid intake.    AVS handout given and reviewed with patient. Pt educated on red flags and when to seek treatment back in ER or UC.

## 2022-05-05 LAB
FLUAV RNA SPEC QL NAA+PROBE: NEGATIVE
FLUBV RNA SPEC QL NAA+PROBE: NEGATIVE
SARS-COV-2 RNA RESP QL NAA+PROBE: NOTDETECTED
SPECIMEN SOURCE: NORMAL

## 2022-11-29 ENCOUNTER — TELEPHONE (OUTPATIENT)
Dept: MEDICAL GROUP | Facility: IMAGING CENTER | Age: 62
End: 2022-11-29

## 2022-11-29 NOTE — TELEPHONE ENCOUNTER
called to schedule with a new pcp  
seen by cards attg- feels sx are related to poor rate control - admit rate control EP notified

## 2023-03-30 ENCOUNTER — OFFICE VISIT (OUTPATIENT)
Dept: MEDICAL GROUP | Age: 63
End: 2023-03-30
Payer: COMMERCIAL

## 2023-03-30 VITALS
DIASTOLIC BLOOD PRESSURE: 78 MMHG | TEMPERATURE: 97.8 F | SYSTOLIC BLOOD PRESSURE: 122 MMHG | BODY MASS INDEX: 29.57 KG/M2 | HEIGHT: 66 IN | WEIGHT: 184 LBS | HEART RATE: 77 BPM | OXYGEN SATURATION: 95 %

## 2023-03-30 DIAGNOSIS — Z00.00 ANNUAL PHYSICAL EXAM: ICD-10-CM

## 2023-03-30 DIAGNOSIS — Z11.59 NEED FOR HEPATITIS C SCREENING TEST: ICD-10-CM

## 2023-03-30 DIAGNOSIS — E78.00 PURE HYPERCHOLESTEROLEMIA: ICD-10-CM

## 2023-03-30 DIAGNOSIS — Z00.00 ENCOUNTER FOR MEDICAL EXAMINATION TO ESTABLISH CARE: ICD-10-CM

## 2023-03-30 DIAGNOSIS — F41.1 GAD (GENERALIZED ANXIETY DISORDER): ICD-10-CM

## 2023-03-30 DIAGNOSIS — Z12.11 SCREENING FOR COLON CANCER: ICD-10-CM

## 2023-03-30 DIAGNOSIS — E55.9 VITAMIN D DEFICIENCY: ICD-10-CM

## 2023-03-30 PROCEDURE — 99214 OFFICE O/P EST MOD 30 MIN: CPT | Performed by: FAMILY MEDICINE

## 2023-03-30 RX ORDER — CLONAZEPAM 0.5 MG/1
0.5 TABLET ORAL 2 TIMES DAILY
Qty: 180 TABLET | Refills: 0 | Status: SHIPPED | OUTPATIENT
Start: 2023-03-30 | End: 2023-06-23 | Stop reason: SDUPTHER

## 2023-03-30 RX ORDER — ESCITALOPRAM OXALATE 10 MG/1
TABLET ORAL
Qty: 270 TABLET | Refills: 0 | Status: SHIPPED | OUTPATIENT
Start: 2023-03-30 | End: 2023-06-23 | Stop reason: SDUPTHER

## 2023-03-30 RX ORDER — HYDROXYZINE 50 MG/1
50 TABLET, FILM COATED ORAL 3 TIMES DAILY PRN
COMMUNITY
End: 2023-06-23

## 2023-03-30 ASSESSMENT — PATIENT HEALTH QUESTIONNAIRE - PHQ9: CLINICAL INTERPRETATION OF PHQ2 SCORE: 0
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
## 2023-04-29 NOTE — LETTER
[facility] [address]  [place] [id number]  [place] [id number]     September 13, 2021    Patient: Edel Wagner   YOB: 1960   Date of Visit: 9/13/2021       To Whom It May Concern:    Edel Wagner was seen and treated in our department on 9/13/2021. She was was unable to work due to illness starting 9/11/2021 and will not be able to return to work until 9/18/2021.    Sincerely,     ELAINE Soni.                
Yes

## 2023-05-17 ENCOUNTER — APPOINTMENT (OUTPATIENT)
Dept: MEDICAL GROUP | Age: 63
End: 2023-05-17
Payer: COMMERCIAL

## 2023-06-06 ENCOUNTER — OFFICE VISIT (OUTPATIENT)
Dept: BEHAVIORAL HEALTH | Facility: CLINIC | Age: 63
End: 2023-06-06
Payer: COMMERCIAL

## 2023-06-06 DIAGNOSIS — F43.10 POST TRAUMATIC STRESS DISORDER (PTSD): ICD-10-CM

## 2023-06-06 DIAGNOSIS — F41.0 GENERALIZED ANXIETY DISORDER WITH PANIC ATTACKS: ICD-10-CM

## 2023-06-06 DIAGNOSIS — F41.1 GENERALIZED ANXIETY DISORDER WITH PANIC ATTACKS: ICD-10-CM

## 2023-06-06 PROCEDURE — 90791 PSYCH DIAGNOSTIC EVALUATION: CPT | Performed by: MARRIAGE & FAMILY THERAPIST

## 2023-06-06 ASSESSMENT — ANXIETY QUESTIONNAIRES
3. WORRYING TOO MUCH ABOUT DIFFERENT THINGS: MORE THAN HALF THE DAYS
6. BECOMING EASILY ANNOYED OR IRRITABLE: NEARLY EVERY DAY
5. BEING SO RESTLESS THAT IT IS HARD TO SIT STILL: NEARLY EVERY DAY
2. NOT BEING ABLE TO STOP OR CONTROL WORRYING: NOT AT ALL
7. FEELING AFRAID AS IF SOMETHING AWFUL MIGHT HAPPEN: SEVERAL DAYS
1. FEELING NERVOUS, ANXIOUS, OR ON EDGE: NEARLY EVERY DAY
4. TROUBLE RELAXING: MORE THAN HALF THE DAYS
GAD7 TOTAL SCORE: 14

## 2023-06-06 ASSESSMENT — PATIENT HEALTH QUESTIONNAIRE - PHQ9
SUM OF ALL RESPONSES TO PHQ QUESTIONS 1-9: 19
5. POOR APPETITE OR OVEREATING: 0 - NOT AT ALL
CLINICAL INTERPRETATION OF PHQ2 SCORE: 6
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
## 2023-06-14 ENCOUNTER — HOSPITAL ENCOUNTER (OUTPATIENT)
Dept: LAB | Facility: MEDICAL CENTER | Age: 63
End: 2023-06-14
Attending: FAMILY MEDICINE
Payer: COMMERCIAL

## 2023-06-14 DIAGNOSIS — E78.00 PURE HYPERCHOLESTEROLEMIA: ICD-10-CM

## 2023-06-14 DIAGNOSIS — Z00.00 ANNUAL PHYSICAL EXAM: ICD-10-CM

## 2023-06-14 DIAGNOSIS — E55.9 VITAMIN D DEFICIENCY: ICD-10-CM

## 2023-06-14 DIAGNOSIS — Z11.59 NEED FOR HEPATITIS C SCREENING TEST: ICD-10-CM

## 2023-06-14 LAB
25(OH)D3 SERPL-MCNC: 67 NG/ML (ref 30–100)
ALBUMIN SERPL BCP-MCNC: 4.3 G/DL (ref 3.2–4.9)
ALBUMIN/GLOB SERPL: 1.5 G/DL
ALP SERPL-CCNC: 81 U/L (ref 30–99)
ALT SERPL-CCNC: 16 U/L (ref 2–50)
ANION GAP SERPL CALC-SCNC: 12 MMOL/L (ref 7–16)
AST SERPL-CCNC: 12 U/L (ref 12–45)
BASOPHILS # BLD AUTO: 1.2 % (ref 0–1.8)
BASOPHILS # BLD: 0.06 K/UL (ref 0–0.12)
BILIRUB SERPL-MCNC: 0.6 MG/DL (ref 0.1–1.5)
BUN SERPL-MCNC: 10 MG/DL (ref 8–22)
CALCIUM ALBUM COR SERPL-MCNC: 9 MG/DL (ref 8.5–10.5)
CALCIUM SERPL-MCNC: 9.2 MG/DL (ref 8.5–10.5)
CHLORIDE SERPL-SCNC: 102 MMOL/L (ref 96–112)
CHOLEST SERPL-MCNC: 184 MG/DL (ref 100–199)
CO2 SERPL-SCNC: 27 MMOL/L (ref 20–33)
CREAT SERPL-MCNC: 0.78 MG/DL (ref 0.5–1.4)
EOSINOPHIL # BLD AUTO: 0.09 K/UL (ref 0–0.51)
EOSINOPHIL NFR BLD: 1.8 % (ref 0–6.9)
ERYTHROCYTE [DISTWIDTH] IN BLOOD BY AUTOMATED COUNT: 41.4 FL (ref 35.9–50)
GFR SERPLBLD CREATININE-BSD FMLA CKD-EPI: 86 ML/MIN/1.73 M 2
GLOBULIN SER CALC-MCNC: 2.8 G/DL (ref 1.9–3.5)
GLUCOSE SERPL-MCNC: 78 MG/DL (ref 65–99)
HCT VFR BLD AUTO: 44.7 % (ref 37–47)
HCV AB SER QL: NORMAL
HDLC SERPL-MCNC: 55 MG/DL
HGB BLD-MCNC: 14.5 G/DL (ref 12–16)
IMM GRANULOCYTES # BLD AUTO: 0.01 K/UL (ref 0–0.11)
IMM GRANULOCYTES NFR BLD AUTO: 0.2 % (ref 0–0.9)
LDLC SERPL CALC-MCNC: 117 MG/DL
LYMPHOCYTES # BLD AUTO: 1.47 K/UL (ref 1–4.8)
LYMPHOCYTES NFR BLD: 28.9 % (ref 22–41)
MCH RBC QN AUTO: 29.7 PG (ref 27–33)
MCHC RBC AUTO-ENTMCNC: 32.4 G/DL (ref 32.2–35.5)
MCV RBC AUTO: 91.6 FL (ref 81.4–97.8)
MONOCYTES # BLD AUTO: 0.41 K/UL (ref 0–0.85)
MONOCYTES NFR BLD AUTO: 8.1 % (ref 0–13.4)
NEUTROPHILS # BLD AUTO: 3.05 K/UL (ref 1.82–7.42)
NEUTROPHILS NFR BLD: 59.8 % (ref 44–72)
NRBC # BLD AUTO: 0 K/UL
NRBC BLD-RTO: 0 /100 WBC (ref 0–0.2)
PLATELET # BLD AUTO: 202 K/UL (ref 164–446)
PMV BLD AUTO: 11.8 FL (ref 9–12.9)
POTASSIUM SERPL-SCNC: 4.1 MMOL/L (ref 3.6–5.5)
PROT SERPL-MCNC: 7.1 G/DL (ref 6–8.2)
RBC # BLD AUTO: 4.88 M/UL (ref 4.2–5.4)
SODIUM SERPL-SCNC: 141 MMOL/L (ref 135–145)
T3FREE SERPL-MCNC: 2.78 PG/ML (ref 2–4.4)
T4 FREE SERPL-MCNC: 1.21 NG/DL (ref 0.93–1.7)
TRIGL SERPL-MCNC: 58 MG/DL (ref 0–149)
TSH SERPL DL<=0.005 MIU/L-ACNC: 1.4 UIU/ML (ref 0.38–5.33)
WBC # BLD AUTO: 5.1 K/UL (ref 4.8–10.8)

## 2023-06-14 PROCEDURE — 82306 VITAMIN D 25 HYDROXY: CPT

## 2023-06-14 PROCEDURE — 80061 LIPID PANEL: CPT

## 2023-06-14 PROCEDURE — 85025 COMPLETE CBC W/AUTO DIFF WBC: CPT

## 2023-06-14 PROCEDURE — 86803 HEPATITIS C AB TEST: CPT

## 2023-06-14 PROCEDURE — 84439 ASSAY OF FREE THYROXINE: CPT

## 2023-06-14 PROCEDURE — 80053 COMPREHEN METABOLIC PANEL: CPT

## 2023-06-14 PROCEDURE — 84443 ASSAY THYROID STIM HORMONE: CPT

## 2023-06-14 PROCEDURE — 36415 COLL VENOUS BLD VENIPUNCTURE: CPT

## 2023-06-14 PROCEDURE — 84481 FREE ASSAY (FT-3): CPT

## 2023-06-23 ENCOUNTER — TELEMEDICINE (OUTPATIENT)
Dept: BEHAVIORAL HEALTH | Facility: CLINIC | Age: 63
End: 2023-06-23
Payer: COMMERCIAL

## 2023-06-23 VITALS — BODY MASS INDEX: 28.93 KG/M2 | WEIGHT: 180 LBS | HEIGHT: 66 IN

## 2023-06-23 DIAGNOSIS — F43.10 PTSD (POST-TRAUMATIC STRESS DISORDER): ICD-10-CM

## 2023-06-23 DIAGNOSIS — F41.1 GAD (GENERALIZED ANXIETY DISORDER): ICD-10-CM

## 2023-06-23 PROBLEM — F41.9 ANXIETY: Status: RESOLVED | Noted: 2019-06-03 | Resolved: 2023-06-23

## 2023-06-23 PROCEDURE — 99205 OFFICE O/P NEW HI 60 MIN: CPT | Mod: 95 | Performed by: PSYCHIATRY & NEUROLOGY

## 2023-06-23 RX ORDER — ESCITALOPRAM OXALATE 10 MG/1
30 TABLET ORAL DAILY
Qty: 270 TABLET | Refills: 0 | Status: SHIPPED | OUTPATIENT
Start: 2023-06-23 | End: 2024-01-24

## 2023-06-23 RX ORDER — CLONAZEPAM 0.5 MG/1
0.5 TABLET ORAL 2 TIMES DAILY PRN
Qty: 60 TABLET | Refills: 1 | Status: SHIPPED | OUTPATIENT
Start: 2023-07-13 | End: 2023-08-12

## 2023-06-23 ASSESSMENT — FIBROSIS 4 INDEX: FIB4 SCORE: 0.91
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
## 2023-10-25 DIAGNOSIS — F41.1 GAD (GENERALIZED ANXIETY DISORDER): ICD-10-CM

## 2023-10-25 DIAGNOSIS — F43.10 PTSD (POST-TRAUMATIC STRESS DISORDER): ICD-10-CM

## 2023-10-25 RX ORDER — ESCITALOPRAM OXALATE 10 MG/1
30 TABLET ORAL DAILY
Qty: 270 TABLET | Refills: 0 | Status: CANCELLED | OUTPATIENT
Start: 2023-10-25

## 2023-10-28 RX ORDER — ESCITALOPRAM OXALATE 10 MG/1
TABLET ORAL
Qty: 270 TABLET | Refills: 0 | Status: SHIPPED | OUTPATIENT
Start: 2023-10-28 | End: 2024-01-24

## 2023-10-30 ENCOUNTER — TELEMEDICINE (OUTPATIENT)
Dept: MEDICAL GROUP | Age: 63
End: 2023-10-30
Payer: COMMERCIAL

## 2023-10-30 VITALS — HEIGHT: 66 IN | WEIGHT: 180 LBS | BODY MASS INDEX: 28.93 KG/M2

## 2023-10-30 DIAGNOSIS — F41.1 GAD (GENERALIZED ANXIETY DISORDER): ICD-10-CM

## 2023-10-30 DIAGNOSIS — F43.10 PTSD (POST-TRAUMATIC STRESS DISORDER): ICD-10-CM

## 2023-10-30 PROCEDURE — 99214 OFFICE O/P EST MOD 30 MIN: CPT | Mod: 95 | Performed by: PHYSICIAN ASSISTANT

## 2023-10-30 RX ORDER — PROPRANOLOL HYDROCHLORIDE 10 MG/1
10 TABLET ORAL 2 TIMES DAILY
COMMUNITY
Start: 2023-09-08 | End: 2024-01-24

## 2023-10-30 RX ORDER — CLONAZEPAM 0.5 MG/1
0.5 TABLET ORAL 2 TIMES DAILY PRN
Qty: 60 TABLET | Refills: 1 | Status: SHIPPED | OUTPATIENT
Start: 2023-10-30 | End: 2024-01-15 | Stop reason: SDUPTHER

## 2023-10-30 RX ORDER — CLONAZEPAM 0.5 MG/1
TABLET ORAL
COMMUNITY
Start: 2023-09-15 | End: 2023-10-30 | Stop reason: SDUPTHER

## 2023-10-30 ASSESSMENT — FIBROSIS 4 INDEX: FIB4 SCORE: 0.91

## 2023-10-30 NOTE — PROGRESS NOTES
Virtual Visit: Established Patient   This visit was conducted via Zoom using secure and encrypted videoconferencing technology.   The patient was in a private location outside of their home in the state of [place].    The patient's identity was confirmed and verbal consent was obtained for this virtual visit.     Subjective:   CC:   Chief Complaint   Patient presents with    Medication Refill     PCP-Dr. Patton unable to see today.  This is my first time meeting her.    Edel Garcia is a 63 y.o. female presenting medication refill.  She is established with psychiatry, she had her first appointment about 3 months ago.  She has been taking 30 mg of Lexapro in addition to 0.5 mg of Klonopin twice daily.  She did not realize that she needed to be seen every 3 months for continued refills for controlled substances.  Her medications were declined by her psychiatrist.  Her PCP is out of office.  She did run out of the Lexapro, had been off of that for about 10 days, her anxiety levels were through the roof at that time.  She did however get that filled and is now back on the Lexapro.  Anxiety levels are improving.  She was taking the Klonopin anywhere from 3 sometimes even 4 times a day.  Reviewed PDMP she would be appropriately due for refill for the Klonopin.  She is requesting refill until she is able to get back into her psychiatrist.        ROS   See above    Current medicines (including changes today)  Current Outpatient Medications   Medication Sig Dispense Refill    clonazePAM (KLONOPIN) 0.5 MG Tab Take 1 Tablet by mouth 2 times a day as needed (anxiey) for up to 30 days. 60 Tablet 1    propranolol (INDERAL) 10 MG Tab Take 10 mg by mouth 2 times a day.      escitalopram (LEXAPRO) 10 MG Tab Take 3 tabs po q24h 270 Tablet 0    escitalopram (LEXAPRO) 10 MG Tab Take 3 Tablets by mouth every day. 270 Tablet 0    Cholecalciferol (VITAMIN D3 PO) Take 10,000 Units by mouth every day.       No current  "facility-administered medications for this visit.       Patient Active Problem List    Diagnosis Date Noted    PTSD (post-traumatic stress disorder) 06/23/2023    CHRISTOPH (generalized anxiety disorder) 09/22/2020    Vitamin D deficiency 06/05/2019    Hair loss 06/03/2019    Insomnia 06/03/2019        Objective:   Ht 1.676 m (5' 6\")   Wt 81.6 kg (180 lb)   BMI 29.05 kg/m²     Physical Exam:  Constitutional: Alert, no distress, well-groomed.  Skin: No rashes in visible areas.  Eye: Round. Conjunctiva clear, lids normal. No icterus.   ENMT: Lips pink without lesions, good dentition, moist mucous membranes. Phonation normal.  Neck: No masses, no thyromegaly. Moves freely without pain.  Respiratory: Unlabored respiratory effort, no cough or audible wheeze  Psych: Alert and oriented x3, normal affect and mood.     Assessment and Plan:   The following treatment plan was discussed:     1. CHRISTOPH (generalized anxiety disorder)  -Has just restarted her Lexapro, continue 30 mg Lexapro daily per recommendations of psychiatrist.  Did discuss that taking Klonopin 4 times a day is not appropriate, should only be taken 3 times daily.  Also will not increase the dose of Klonopin, will fill her regular scheduled dose of 0.5 mg 2 times a day as needed.  She is understanding of this.  She will call and schedule appointment with her psychiatrist for continued medication management.  2-month prescription given.  - clonazePAM (KLONOPIN) 0.5 MG Tab; Take 1 Tablet by mouth 2 times a day as needed (anxiey) for up to 30 days.  Dispense: 60 Tablet; Refill: 1    2. PTSD (post-traumatic stress disorder)  -see above  - clonazePAM (KLONOPIN) 0.5 MG Tab; Take 1 Tablet by mouth 2 times a day as needed (anxiey) for up to 30 days.  Dispense: 60 Tablet; Refill: 1          Follow-up: Return in about 4 months (around 2/29/2024) for Annual PX w/pcp.         "

## 2024-01-03 ENCOUNTER — HOSPITAL ENCOUNTER (OUTPATIENT)
Dept: RADIOLOGY | Facility: MEDICAL CENTER | Age: 64
End: 2024-01-03
Attending: INTERNAL MEDICINE

## 2024-01-03 ENCOUNTER — OFFICE VISIT (OUTPATIENT)
Dept: BEHAVIORAL HEALTH | Facility: CLINIC | Age: 64
End: 2024-01-03
Payer: COMMERCIAL

## 2024-01-03 DIAGNOSIS — Z12.31 VISIT FOR SCREENING MAMMOGRAM: ICD-10-CM

## 2024-01-03 DIAGNOSIS — F43.10 PTSD (POST-TRAUMATIC STRESS DISORDER): ICD-10-CM

## 2024-01-03 DIAGNOSIS — F41.1 GAD (GENERALIZED ANXIETY DISORDER): ICD-10-CM

## 2024-01-03 PROCEDURE — 77067 SCR MAMMO BI INCL CAD: CPT

## 2024-01-03 PROCEDURE — 90834 PSYTX W PT 45 MINUTES: CPT | Performed by: MARRIAGE & FAMILY THERAPIST

## 2024-01-03 NOTE — PROGRESS NOTES
[facility]   Psychotherapy Progress Note    Therapy was provided on this date in coordination with the [facility] approved Clinical Supervisor under the direct supervision of Dr. Zachary Pearce who was on site during this visit.     Name: Edel Garcia  MRN: [id number]  : 1960  Age: 63 y.o.  Date of assessment: 1/3/2024  PCP: Galindo Patton M.D.  Persons in attendance: Patient &  Diomedes  Total session time: 50 minutes      Topics addressed in psychotherapy include: Tricia reports attending therapy in conjunction with psychiatric visits to improve wellbeing, and that her daughter is estranged from her since April and has told her to go to therapy to address her anxiety, then they can do family therapy, and then they can have a better relationship.  Diomedes reports that from his perspective Tricia's daughter Sasha has always been conflict oriented and makes comments that indicate she feels superior to her mom, Tricia, and that she wants to be the Matriarch and it is probably/ hopefully just a phase she is going through.  Tricia is unable to identify how she will know when she has reached her goals of therapy and much of her relationship with her daughter and the problems Sasha sees are just speculation without sasha present in therapy.  For homework Tricia is assigned to think about how she will know when she is finished with therapy, and we discussed possible acceptance commitment therapy about the terms of her relationship with her daughter for now.     Objective Observations:   Participation:Active verbal participation, Attentive, Engaged, and Open to feedback   Grooming:Casual and Neat   Cognition:Alert and Fully Oriented   Eye Contact:Good   Mood:Euthymic   Affect:Flexible and Full range   Thought Process:Logical and Goal-directed   Speech:Rate within normal limits and Volume within normal limits    Current Risk:   Suicide: low   Homicide: low   Self-Harm: low     Care  Plan Updated: Yes    Does patient express agreement with the above plan? Yes     Diagnosis:  1. CHRISTOPH (generalized anxiety disorder)    2. PTSD (post-traumatic stress disorder)        Referral appointment(s) scheduled? No       FABIO Vazquez Intern

## 2024-01-12 ENCOUNTER — APPOINTMENT (OUTPATIENT)
Dept: BEHAVIORAL HEALTH | Facility: CLINIC | Age: 64
End: 2024-01-12
Payer: COMMERCIAL

## 2024-01-15 ENCOUNTER — APPOINTMENT (OUTPATIENT)
Dept: BEHAVIORAL HEALTH | Facility: CLINIC | Age: 64
End: 2024-01-15
Payer: COMMERCIAL

## 2024-01-15 DIAGNOSIS — F41.1 GAD (GENERALIZED ANXIETY DISORDER): ICD-10-CM

## 2024-01-15 DIAGNOSIS — F43.10 PTSD (POST-TRAUMATIC STRESS DISORDER): ICD-10-CM

## 2024-01-15 RX ORDER — CLONAZEPAM 0.5 MG/1
0.5 TABLET ORAL 2 TIMES DAILY PRN
Qty: 14 TABLET | Refills: 0 | Status: SHIPPED | OUTPATIENT
Start: 2024-01-18 | End: 2024-01-24 | Stop reason: SDUPTHER

## 2024-01-15 NOTE — TELEPHONE ENCOUNTER
Pt is requesting a week supply of clonazepam. Upcoming appt 1/23/24 @ 11 AM.    Received request via: Patient    Was the patient seen in the last year in this department? No    Does the patient have an active prescription (recently filled or refills available) for medication(s) requested? No    Does the patient have group home Plus and need 100 day supply (blood pressure, diabetes and cholesterol meds only)? Medication is not for cholesterol, blood pressure or diabetes and Patient does not have SCP

## 2024-01-23 ENCOUNTER — APPOINTMENT (OUTPATIENT)
Dept: BEHAVIORAL HEALTH | Facility: CLINIC | Age: 64
End: 2024-01-23
Payer: COMMERCIAL

## 2024-01-24 ENCOUNTER — OFFICE VISIT (OUTPATIENT)
Dept: BEHAVIORAL HEALTH | Facility: CLINIC | Age: 64
End: 2024-01-24
Payer: COMMERCIAL

## 2024-01-24 VITALS — WEIGHT: 189 LBS | BODY MASS INDEX: 30.37 KG/M2 | HEIGHT: 66 IN

## 2024-01-24 DIAGNOSIS — F12.90 CONTINUOUS CANNABIS USE: ICD-10-CM

## 2024-01-24 DIAGNOSIS — F43.10 PTSD (POST-TRAUMATIC STRESS DISORDER): ICD-10-CM

## 2024-01-24 DIAGNOSIS — F41.1 GAD (GENERALIZED ANXIETY DISORDER): ICD-10-CM

## 2024-01-24 PROCEDURE — 99214 OFFICE O/P EST MOD 30 MIN: CPT | Performed by: PSYCHIATRY & NEUROLOGY

## 2024-01-24 PROCEDURE — 90833 PSYTX W PT W E/M 30 MIN: CPT | Performed by: PSYCHIATRY & NEUROLOGY

## 2024-01-24 RX ORDER — DULOXETIN HYDROCHLORIDE 60 MG/1
60 CAPSULE, DELAYED RELEASE ORAL DAILY
Qty: 30 CAPSULE | Refills: 1 | Status: SHIPPED | OUTPATIENT
Start: 2024-01-24 | End: 2024-03-18

## 2024-01-24 RX ORDER — CLONAZEPAM 0.5 MG/1
0.5 TABLET ORAL 2 TIMES DAILY PRN
Qty: 60 TABLET | Refills: 1 | Status: SHIPPED | OUTPATIENT
Start: 2024-01-24 | End: 2024-02-23

## 2024-01-24 ASSESSMENT — FIBROSIS 4 INDEX: FIB4 SCORE: 0.91

## 2024-01-24 NOTE — PROGRESS NOTES
PSYCHIATRY FOLLOW-UP NOTE    Chief Complaint   Patient presents with    Follow-Up     anxiety     History Of Present Illness:  Edel Garcia is a 63 y.o. female with generalized anxiety disorder, posttraumatic stress disorder comes in today for follow up, was last seen 7 months ago.  She was seen today with her .  She was fired from her job at the [employer] about a month ago and since then her anxiety has exacerbated.  She does feel that her job at the [employer] was a source of her stress and in a way she is happy that she is no longer at the job.  However, this has exacerbated her fear of failure as well as her anxiety.  She is trying to find another job at another [employer] and is hoping that this will be a better job for her.  She continues to have no relationship with her kids and that is another stress that bothers her a lot.  She has been waking up with anxiety and has been using cannabis, Klonopin or alcohol to calm herself down.  She is having difficulties with sleep.  She denies having thoughts of wanting to hurt herself.    Social History:   She is ,  x 2 and  x 1, 2 kids from first marriage - daughter and transgender son and both of them live in [place], minimal relationship with kids, lives with  in [place], fired from job [employer] in 12/2023.     Substance Use:  Alcohol - 2 beers daily  Nicotine - Denies  Cannabis - Smokes THC daily   Illicit drugs - Denies     Past Medication Trials:  Celexa (stopped working), Lexapro 30 mg x 4+ years (stopped working), Hydroxyzine 50 mg TID (ineffective), Propranolol 10 mg BID (ineffective), Buspirone, Xanax (s/e - sedation)     Medications:  Current Outpatient Medications   Medication Sig Dispense Refill    DULoxetine (CYMBALTA) 60 MG Cap DR Particles delayed-release capsule Take 1 Capsule by mouth every day. 30 Capsule 1    clonazePAM (KLONOPIN) 0.5 MG Tab Take 1 Tablet by mouth 2 times a day as needed (anxiety)  "for up to 30 days. 60 Tablet 1     No current facility-administered medications for this visit.     Review Of Systems:    Constitutional - Positive for fatigue  Psychiatric - Positive for anxiety    Physical Examination:  Vital signs: Ht 1.676 m (5' 6\")   Wt 85.7 kg (189 lb)   BMI 30.51 kg/m²     Musculoskeletal: Normal gait. No abnormal movements.     Mental Status Evaluation:   General: Middle aged female, dressed in casual attire, good grooming and hygiene, in no apparent distress, calm and cooperative, good eye contact, no psychomotor agitation or retardation  Orientation: Alert and oriented to person, place and time  Recent and remote memory: Grossly intact  Attention span and concentration: Grossly intact  Speech: Spontaneous, normal rate, rhythm and tone  Thought Process: Linear, logical and goal directed  Thought Content: Denies suicidal or homicidal ideations, intent or plan  Perception: Denies auditory or visual hallucinations. No delusions noted  Associations: Intact  Language: Appropriate  Fund of knowledge and vocabulary: Grossly adequate  Mood: \"alright\"  Affect: Anxious, mood congruent  Insight: Good  Judgment: Good    Depression screenin/6/2021     1:00 PM 3/30/2023    11:20 AM 2023    10:00 AM   Depression Screen (PHQ-2/PHQ-9)   PHQ-2 Total Score 1 0 6   PHQ-9 Total Score 8  19     Interpretation of PHQ-9 Total Score   Score Severity   1-4 No Depression   5-9 Mild Depression   10-14 Moderate Depression   15-19 Moderately Severe Depression   20-27 Severe Depression    Anxiety screenin/22/2020     9:02 AM 10/27/2020    10:03 AM 2023     1:25 PM   CHRISTOPH 7   Total Score 21 0    CHRISTOPH-7 Total Score   14     Interpretation of CHRISTOPH 7 Total Score   Score Severity:  0-4 No Anxiety   5-9 Mild Anxiety  10-14 Moderate Anxiety  15-21 Severe Anxiety    Medical Records/Labs/Diagnostic Tests Reviewed:  [place] PDMP records - appropriate refills      Impression:  1.  Generalized anxiety disorder - " worsening   2.  Post traumatic stress disorder (childhood and marital abuse) - stable    Plan:  1.  Taper down Lexapro to 20 mg daily for 4 days followed by 10 mg daily for 4 days and then discontinue  2.  Start Cymbalta 60 mg in the morning for anxiety  3.  She will be taking both Lexapro and Cymbalta together for the next 8 days, after that we will discontinue Lexapro and continue with Cymbalta alone to minimize discontinuation symptoms.  4.  Continue Klonopin 0.5 mg twice daily as needed for anxiety.  E-prescribed x 2 months.  I have again advised her to use Klonopin only on as-needed basis, discussed risk of physical and psychological dependence and long-term plan of eventually tapering off daily Klonopin use.  I have again reminded her to avoid Klonopin with alcohol.  5.  Discussed how she is using cannabis to emotionally numb herself but in the long-term it is not an effective treatment for management of anxiety.  Will continue to address this at follow-up appointments.  Continue Lexapro 30 mg daily for anxiety  6.  Continue individual psychotherapy with Stevie Pugh, T intern  7.  Provided supportive psychotherapy (> 16 minutes): Discussed anxiety related to being fired from her job and how that has brought on some stuff from the past and exacerbated her feelings of failure, encouraged her to follow-up on her plan of getting a job which will give her reasons to stay motivated and active, feelings related to not having a relationship with both of her kids and how that affects her.    Return to clinic in 2 months or sooner if symptoms worsen    The proposed treatment plan was discussed with the patient who was provided the opportunity to ask questions and make suggestions regarding alternative treatment. Patient verbalized understanding and expressed agreement with the plan.       Lily Vance M.D.  01/24/24    This note was created using voice recognition software (Dragon). The accuracy of the  dictation is limited by the abilities of the software. I have reviewed the note prior to signing, however some errors in grammar and context are still possible. If you have any questions related to this note please do not hesitate to contact our office.

## 2024-01-31 ENCOUNTER — OFFICE VISIT (OUTPATIENT)
Dept: BEHAVIORAL HEALTH | Facility: CLINIC | Age: 64
End: 2024-01-31
Payer: COMMERCIAL

## 2024-01-31 DIAGNOSIS — F43.10 PTSD (POST-TRAUMATIC STRESS DISORDER): ICD-10-CM

## 2024-01-31 DIAGNOSIS — F41.1 GAD (GENERALIZED ANXIETY DISORDER): ICD-10-CM

## 2024-01-31 PROCEDURE — 90834 PSYTX W PT 45 MINUTES: CPT | Performed by: MARRIAGE & FAMILY THERAPIST

## 2024-01-31 NOTE — PROGRESS NOTES
[facility]   Psychotherapy Progress Note    Therapy was provided on this date in coordination with the [facility] approved Clinical Supervisor under the direct supervision of Dr. Zachary Pearce who was on site during this visit.     Name: Edel Garcia  MRN: [id number]  : 1960  Age: 63 y.o.  Date of assessment: 2024  PCP: Galindo Patton M.D.  Persons in attendance: Patient & Spouse  Total session time: 50 minutes      Topics addressed in psychotherapy include: Diomedes reports that Tricia's children are both LGBTQ members and that may be a reason they resent her.  We explored how Tricia's parenting was influenced by the bible and potential homophobic messages that the children may have interpreted from their environment up until the time they each came out to Tricia and she verbalized complete acceptance of their sexual orientation.  Tricia reports excessive anxiety when she thinks about her kids.  We reviewed two grounding techniques as thought stopping actions per CBT Principals and discussed what acceptance of the boundaries her children have set looks like for Tricia.     Objective Observations:   Participation:Active verbal participation, Attentive, Engaged, and Open to feedback   Grooming:Casual and Neat   Cognition:Alert and Fully Oriented   Eye Contact:Good   Mood:Euthymic and Anxious   Affect:Flexible, Full range, Expansive, and Congruent with content   Thought Process:Logical and Goal-directed   Speech:Rate within normal limits and Volume within normal limits    Current Risk:   Suicide: low   Homicide: low   Self-Harm: low     Care Plan Updated: Yes    Does patient express agreement with the above plan? Yes     Diagnosis:  1. CHRISTOPH (generalized anxiety disorder)    2. PTSD (post-traumatic stress disorder)        Referral appointment(s) scheduled? Yes      FABIO Vazquez Intern

## 2024-02-07 ENCOUNTER — APPOINTMENT (OUTPATIENT)
Dept: BEHAVIORAL HEALTH | Facility: CLINIC | Age: 64
End: 2024-02-07
Payer: COMMERCIAL

## 2024-02-14 ENCOUNTER — OFFICE VISIT (OUTPATIENT)
Dept: BEHAVIORAL HEALTH | Facility: CLINIC | Age: 64
End: 2024-02-14
Payer: COMMERCIAL

## 2024-02-14 DIAGNOSIS — F43.10 PTSD (POST-TRAUMATIC STRESS DISORDER): ICD-10-CM

## 2024-02-14 DIAGNOSIS — F41.1 GAD (GENERALIZED ANXIETY DISORDER): ICD-10-CM

## 2024-02-14 PROCEDURE — 90834 PSYTX W PT 45 MINUTES: CPT | Performed by: MARRIAGE & FAMILY THERAPIST

## 2024-02-14 NOTE — PROGRESS NOTES
[facility]   Psychotherapy Progress Note    Therapy was provided on this date in coordination with the [facility] approved Clinical Supervisor under the direct supervision of Dr. Zachary Pearce who was on site during this visit.     Name: Edel Garcia  MRN: [id number]  : 1960  Age: 63 y.o.  Date of assessment: 2024  PCP: Galindo Patton M.D.  Persons in attendance: Patient & Spouse  Total session time: 50 minutes      Topics addressed in psychotherapy include: Tricia reports her breakthrough she made last week when discussing with her  is that when she has anxiety that others sense it and are affected by it, including her children. She discussed this with her kids and they agreed with her that it has been a major factor in their relationship with her.  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  We discussed relaxation tools that we have reviewed in therapy before as ways to reduce severity of anxiety.  Supportive psychotherapy provided.     Objective Observations:   Participation:Active verbal participation, Attentive, Engaged, and Open to feedback   Grooming:Casual and Neat   Cognition:Alert and Fully Oriented   Eye Contact:Good   Mood:Euthymic   Affect:Flexible, Full range, Expansive, and Congruent with content   Thought Process:Logical and Goal-directed   Speech:Rate within normal limits and Volume within normal limits    Current Risk:   Suicide: low   Homicide: low   Self-Harm: low     Care Plan Updated: Yes    Does patient express agreement with the above plan? Yes     Diagnosis:  1. CHRISTOPH (generalized anxiety disorder)    2. PTSD (post-traumatic stress disorder)        Referral appointment(s)  scheduled? Yes       FABIO Vazquez Intern

## 2024-02-27 ENCOUNTER — APPOINTMENT (OUTPATIENT)
Dept: BEHAVIORAL HEALTH | Facility: CLINIC | Age: 64
End: 2024-02-27
Payer: COMMERCIAL

## 2024-03-17 DIAGNOSIS — F41.1 GAD (GENERALIZED ANXIETY DISORDER): ICD-10-CM

## 2024-03-17 DIAGNOSIS — F43.10 PTSD (POST-TRAUMATIC STRESS DISORDER): ICD-10-CM

## 2024-03-18 RX ORDER — DULOXETIN HYDROCHLORIDE 60 MG/1
60 CAPSULE, DELAYED RELEASE ORAL DAILY
Qty: 30 CAPSULE | Refills: 0 | Status: SHIPPED | OUTPATIENT
Start: 2024-03-18

## 2024-03-18 NOTE — TELEPHONE ENCOUNTER
Received request via: Pharmacy    Was the patient seen in the last year in this department? Yes    Does the patient have an active prescription (recently filled or refills available) for medication(s) requested? No    Pharmacy Name: [facility]    Does the patient have half-way Plus and need 100 day supply (blood pressure, diabetes and cholesterol meds only)? Medication is not for cholesterol, blood pressure or diabetes and Patient does not have SCP

## 2024-03-20 ENCOUNTER — OFFICE VISIT (OUTPATIENT)
Dept: BEHAVIORAL HEALTH | Facility: CLINIC | Age: 64
End: 2024-03-20
Payer: COMMERCIAL

## 2024-03-20 DIAGNOSIS — F43.10 PTSD (POST-TRAUMATIC STRESS DISORDER): ICD-10-CM

## 2024-03-20 DIAGNOSIS — F41.1 GAD (GENERALIZED ANXIETY DISORDER): ICD-10-CM

## 2024-03-20 PROCEDURE — 90834 PSYTX W PT 45 MINUTES: CPT | Performed by: MARRIAGE & FAMILY THERAPIST
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
## 2024-04-24 ENCOUNTER — APPOINTMENT (OUTPATIENT)
Dept: BEHAVIORAL HEALTH | Facility: CLINIC | Age: 64
End: 2024-04-24
Payer: COMMERCIAL

## 2024-04-24 ENCOUNTER — OFFICE VISIT (OUTPATIENT)
Dept: BEHAVIORAL HEALTH | Facility: CLINIC | Age: 64
End: 2024-04-24
Payer: COMMERCIAL

## 2024-04-24 DIAGNOSIS — F43.10 PTSD (POST-TRAUMATIC STRESS DISORDER): ICD-10-CM

## 2024-04-24 DIAGNOSIS — F41.1 GAD (GENERALIZED ANXIETY DISORDER): ICD-10-CM

## 2024-04-24 PROCEDURE — 90834 PSYTX W PT 45 MINUTES: CPT | Performed by: MARRIAGE & FAMILY THERAPIST
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
## 2024-05-01 ENCOUNTER — APPOINTMENT (OUTPATIENT)
Dept: BEHAVIORAL HEALTH | Facility: CLINIC | Age: 64
End: 2024-05-01
Payer: COMMERCIAL

## 2024-05-01 VITALS — WEIGHT: 188 LBS | BODY MASS INDEX: 30.22 KG/M2 | HEIGHT: 66 IN

## 2024-05-01 DIAGNOSIS — G47.00 INSOMNIA, UNSPECIFIED TYPE: ICD-10-CM

## 2024-05-01 DIAGNOSIS — F43.10 PTSD (POST-TRAUMATIC STRESS DISORDER): ICD-10-CM

## 2024-05-01 DIAGNOSIS — F41.1 GAD (GENERALIZED ANXIETY DISORDER): ICD-10-CM

## 2024-05-01 PROCEDURE — 90833 PSYTX W PT W E/M 30 MIN: CPT | Performed by: PSYCHIATRY & NEUROLOGY

## 2024-05-01 PROCEDURE — 99214 OFFICE O/P EST MOD 30 MIN: CPT | Performed by: PSYCHIATRY & NEUROLOGY

## 2024-05-01 RX ORDER — CLONAZEPAM 0.5 MG/1
0.5 TABLET ORAL 2 TIMES DAILY PRN
Qty: 30 TABLET | Refills: 1 | Status: SHIPPED | OUTPATIENT
Start: 2024-05-03 | End: 2024-06-02

## 2024-05-01 RX ORDER — TRAZODONE HYDROCHLORIDE 50 MG/1
25-50 TABLET ORAL NIGHTLY PRN
Qty: 30 TABLET | Refills: 1 | Status: SHIPPED | OUTPATIENT
Start: 2024-05-01

## 2024-05-01 RX ORDER — FLUOXETINE HYDROCHLORIDE 20 MG/1
20 CAPSULE ORAL EVERY MORNING
Qty: 30 CAPSULE | Refills: 1 | Status: SHIPPED | OUTPATIENT
Start: 2024-05-01

## 2024-05-01 RX ORDER — CLONAZEPAM 0.5 MG/1
0.5 TABLET ORAL 2 TIMES DAILY PRN
COMMUNITY
Start: 2024-04-05 | End: 2024-05-01 | Stop reason: SDUPTHER

## 2024-05-01 ASSESSMENT — FIBROSIS 4 INDEX: FIB4 SCORE: 0.92

## 2024-05-23 ENCOUNTER — APPOINTMENT (OUTPATIENT)
Dept: BEHAVIORAL HEALTH | Facility: CLINIC | Age: 64
End: 2024-05-23
Payer: COMMERCIAL

## 2024-06-05 ENCOUNTER — APPOINTMENT (OUTPATIENT)
Dept: BEHAVIORAL HEALTH | Facility: CLINIC | Age: 64
End: 2024-06-05
Payer: COMMERCIAL

## 2024-06-26 ENCOUNTER — OFFICE VISIT (OUTPATIENT)
Dept: BEHAVIORAL HEALTH | Facility: CLINIC | Age: 64
End: 2024-06-26
Payer: COMMERCIAL

## 2024-06-26 VITALS — HEIGHT: 66 IN | BODY MASS INDEX: 29.73 KG/M2 | WEIGHT: 185 LBS

## 2024-06-26 DIAGNOSIS — F41.1 GAD (GENERALIZED ANXIETY DISORDER): ICD-10-CM

## 2024-06-26 DIAGNOSIS — F43.10 PTSD (POST-TRAUMATIC STRESS DISORDER): ICD-10-CM

## 2024-06-26 DIAGNOSIS — G47.00 INSOMNIA, UNSPECIFIED TYPE: ICD-10-CM

## 2024-06-26 PROBLEM — F12.90 CONTINUOUS CANNABIS USE: Status: RESOLVED | Noted: 2024-01-24 | Resolved: 2024-06-26

## 2024-06-26 RX ORDER — CLONAZEPAM 0.5 MG/1
TABLET ORAL
COMMUNITY
Start: 2024-06-04

## 2024-06-26 RX ORDER — FLUOXETINE HYDROCHLORIDE 20 MG/1
20 CAPSULE ORAL EVERY MORNING
Qty: 90 CAPSULE | Refills: 0 | Status: SHIPPED | OUTPATIENT
Start: 2024-06-26

## 2024-06-26 RX ORDER — TRAZODONE HYDROCHLORIDE 50 MG/1
25-50 TABLET ORAL
Qty: 90 TABLET | Refills: 0 | Status: SHIPPED | OUTPATIENT
Start: 2024-06-26

## 2024-06-26 ASSESSMENT — PATIENT HEALTH QUESTIONNAIRE - PHQ9
5. POOR APPETITE OR OVEREATING: 1 - SEVERAL DAYS
CLINICAL INTERPRETATION OF PHQ2 SCORE: 0
SUM OF ALL RESPONSES TO PHQ QUESTIONS 1-9: 2

## 2024-06-26 ASSESSMENT — FIBROSIS 4 INDEX: FIB4 SCORE: 0.92

## 2024-06-26 ASSESSMENT — ANXIETY QUESTIONNAIRES
3. WORRYING TOO MUCH ABOUT DIFFERENT THINGS: NOT AT ALL
1. FEELING NERVOUS, ANXIOUS, OR ON EDGE: NOT AT ALL
5. BEING SO RESTLESS THAT IT IS HARD TO SIT STILL: NOT AT ALL
2. NOT BEING ABLE TO STOP OR CONTROL WORRYING: NOT AT ALL
4. TROUBLE RELAXING: NOT AT ALL
IF YOU CHECKED OFF ANY PROBLEMS ON THIS QUESTIONNAIRE, HOW DIFFICULT HAVE THESE PROBLEMS MADE IT FOR YOU TO DO YOUR WORK, TAKE CARE OF THINGS AT HOME, OR GET ALONG WITH OTHER PEOPLE: NOT DIFFICULT AT ALL
7. FEELING AFRAID AS IF SOMETHING AWFUL MIGHT HAPPEN: NOT AT ALL
6. BECOMING EASILY ANNOYED OR IRRITABLE: NOT AT ALL
GAD7 TOTAL SCORE: 0
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
## 2024-09-26 ENCOUNTER — APPOINTMENT (OUTPATIENT)
Dept: BEHAVIORAL HEALTH | Facility: CLINIC | Age: 64
End: 2024-09-26
Payer: COMMERCIAL

## 2024-09-30 DIAGNOSIS — G47.00 INSOMNIA, UNSPECIFIED TYPE: ICD-10-CM

## 2024-09-30 RX ORDER — TRAZODONE HYDROCHLORIDE 50 MG/1
25-50 TABLET, FILM COATED ORAL
Qty: 30 TABLET | Refills: 0 | Status: SHIPPED | OUTPATIENT
Start: 2024-09-30

## 2024-10-01 DIAGNOSIS — F41.1 GAD (GENERALIZED ANXIETY DISORDER): ICD-10-CM

## 2024-10-01 DIAGNOSIS — F43.10 PTSD (POST-TRAUMATIC STRESS DISORDER): ICD-10-CM

## 2024-10-24 ENCOUNTER — APPOINTMENT (OUTPATIENT)
Dept: BEHAVIORAL HEALTH | Facility: CLINIC | Age: 64
End: 2024-10-24
Payer: COMMERCIAL

## 2024-11-12 DIAGNOSIS — F43.10 PTSD (POST-TRAUMATIC STRESS DISORDER): ICD-10-CM

## 2024-11-12 DIAGNOSIS — F41.1 GAD (GENERALIZED ANXIETY DISORDER): ICD-10-CM

## 2024-11-12 DIAGNOSIS — G47.00 INSOMNIA, UNSPECIFIED TYPE: ICD-10-CM

## 2024-11-12 RX ORDER — TRAZODONE HYDROCHLORIDE 50 MG/1
25-50 TABLET, FILM COATED ORAL NIGHTLY
Qty: 30 TABLET | Refills: 1 | Status: SHIPPED | OUTPATIENT
Start: 2024-11-12

## 2024-11-17 ENCOUNTER — OCCUPATIONAL MEDICINE (OUTPATIENT)
Dept: URGENT CARE | Facility: CLINIC | Age: 64
End: 2024-11-17
Payer: COMMERCIAL

## 2024-11-17 ENCOUNTER — APPOINTMENT (OUTPATIENT)
Dept: RADIOLOGY | Facility: IMAGING CENTER | Age: 64
End: 2024-11-17
Attending: PHYSICIAN ASSISTANT

## 2024-11-17 ENCOUNTER — HOSPITAL ENCOUNTER (OUTPATIENT)
Dept: RADIOLOGY | Facility: MEDICAL CENTER | Age: 64
End: 2024-11-17
Attending: PHYSICIAN ASSISTANT

## 2024-11-17 VITALS
TEMPERATURE: 97.4 F | HEIGHT: 66 IN | BODY MASS INDEX: 28.12 KG/M2 | RESPIRATION RATE: 18 BRPM | OXYGEN SATURATION: 99 % | SYSTOLIC BLOOD PRESSURE: 118 MMHG | HEART RATE: 64 BPM | WEIGHT: 175 LBS | DIASTOLIC BLOOD PRESSURE: 72 MMHG
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
## 2024-11-17 DIAGNOSIS — S69.92XA HAND INJURY, LEFT, INITIAL ENCOUNTER: ICD-10-CM

## 2024-11-17 DIAGNOSIS — S63.92XA SPRAIN OF LEFT HAND, INITIAL ENCOUNTER: ICD-10-CM

## 2024-11-17 DIAGNOSIS — S89.91XA INJURY OF RIGHT KNEE, INITIAL ENCOUNTER: ICD-10-CM

## 2024-11-17 DIAGNOSIS — S80.01XA CONTUSION OF RIGHT KNEE, INITIAL ENCOUNTER: ICD-10-CM

## 2024-11-17 DIAGNOSIS — S09.90XA INJURY OF HEAD, INITIAL ENCOUNTER: ICD-10-CM

## 2024-11-17 PROCEDURE — 99215 OFFICE O/P EST HI 40 MIN: CPT | Performed by: PHYSICIAN ASSISTANT

## 2024-11-17 PROCEDURE — 73564 X-RAY EXAM KNEE 4 OR MORE: CPT | Mod: TC,RT | Performed by: RADIOLOGY

## 2024-11-17 PROCEDURE — 73130 X-RAY EXAM OF HAND: CPT | Mod: TC,LT | Performed by: RADIOLOGY

## 2024-11-17 PROCEDURE — 70450 CT HEAD/BRAIN W/O DYE: CPT

## 2024-11-17 PROCEDURE — 3078F DIAST BP <80 MM HG: CPT | Performed by: PHYSICIAN ASSISTANT

## 2024-11-17 PROCEDURE — 3074F SYST BP LT 130 MM HG: CPT | Performed by: PHYSICIAN ASSISTANT

## 2024-11-17 ASSESSMENT — FIBROSIS 4 INDEX: FIB4 SCORE: 0.92

## 2024-11-17 NOTE — LETTER
"    EMPLOYEE’S CLAIM FOR COMPENSATION/ REPORT OF INITIAL TREATMENT  FORM C-4  PLEASE TYPE OR PRINT    EMPLOYEE’S CLAIM - PROVIDE ALL INFORMATION REQUESTED   First Name                    LUTHER Hollingsworth                  Last Name  Bernard Garcia Birthdate                    1960                Sex  Female Claim Number (Insurer’s Use Only)     Home Address  [address] Age  64 y.o. Height  1.676 m (5' 6\") Weight  79.4 kg (175 lb) Social Security Number     [facility] Zip  [address] Telephone  [phone] (home) [phone] (work)   Mailing Address  [address] Zip  [address] Primary Language Spoken  English    INSURER   [employer]   [employer]   Employee's Occupation (Job Title) When Injury or Occupational Disease Occurred  [employer]    Employer's Name/Company Name  [employer]  Telephone  [phone]    Office Mail Address (Number and Street)  [address] [place] [address]     Date of Injury (if applicable) 11/15/2024               Hours Injury (if applicable)  12:00 PM Date Employer Notified  11/15/2024 Last Day of Work after Injury or Occupational Disease  11/15/2024 Supervisor to Whom Injury Reported  Hina   Address or Location of Accident (if applicable)  Work [1]   What were you doing at the time of accident? (if applicable)  Sliped on wet floor in restroom    How did this injury or occupational disease occur? (Be specific and answer in detail. Use additional sheet if necessary)  Slipped on wet floor, hit my chin on sink top. fell to floor hitting head on floor, injured hand to breakfall.   If you believe that you have an occupational disease, when did you first have knowledge of the disability and its relationship to your employment?  n/a Witnesses to the Accident (if applicable)  man outside restroom door got the attention of staff.      Nature " of Injury or Occupational Disease  Contusion  Part(s) of Body Injured or Affected  Skull Knee (R) Wrist (R) and Hand (R)    I CERTIFY THAT THE ABOVE IS TRUE AND CORRECT TO T HE BEST OF MY KNOWLEDGE AND THAT I HAVE PROVIDED THIS INFORMATION IN ORDER TO OBTAIN THE BENEFITS OF [place]CC[place], OR [place]).  I HEREBY AUTHORIZE ANY PHYSICIAN, CHIROPRACTOR, SURGEON, PRACTITIONER OR ANY OTHER PERSON, ANY HOSPITAL, INCLUDING [facility] OR [facility], ANY  MEDICAL SERVICE ORGANIZATION, ANY INSURANCE COMPANY, OR OTHER INSTITUTION OR ORGANIZATION TO RELEASE TO EACH OTHER, ANY MEDICAL OR OTHER INFORMATION, INCLUDING BENEFITS PAID OR PAYABLE, PERTINENT TO THIS INJURY OR DISEASE, EXCEPT INFORMATION RELATIVE TO DIAGNOSIS, TREATMENT AND/OR COUNSELING FOR AIDS, PSYCHOLOGICAL CONDITIONS, ALCOHOL OR CONTROLLED SUBSTANCES, FOR WHICH I MUST GIVE SPECIFIC AUTHORIZATION.  A PHOTOSTAT OF THIS AUTHORIZATION SHALL BE VALID AS THE ORIGINAL.     Date 11/17/2024   [facility] Employee’s Original or  *Electronic Signature   THIS REPORT MUST BE COMPLETED AND MAILED WITHIN 3 WORKING DAYS OF TREATMENT   [facility]    Name of Facility  [facility]   Date 11/17/2024 Diagnosis and Description of Injury or Occupational Disease  (W19.XXXA) Fall, initial encounter  (S09.90XA) Injury of head, initial encounter  (S89.91XA) Injury of right knee, initial encounter  (S69.92XA) Hand injury, left, initial encounter  Diagnoses of Fall, initial encounter, Injury of head, initial encounter, Injury of right knee, initial encounter, and Hand injury, left, initial encounter were pertinent to this visit. Is there evidence that the injured employee was under the influence of alcohol and/or another controlled substance at the time of accident?  []No  [] Yes (if yes, please explain)   Hour 2:33 PM  No   Treatment: RICE therapy and OTC meds     Have you advised the patient to remain off work five days or more?   [] Yes Indicate dates: From   To    [] No      If no, is the injured employee capable of: [] full duty [] modified duty                     If modified duty, specify any limitations / restrictions:  No pushing pulling or lifting more than 10 pounds                                                                                                                                                                                                                                                                                                                                                                                                               X-Ray Findings: Negative    From information given by the employee, together with medical evidence, can you directly connect this injury or occupational disease as job incurred?  []Yes   [] No Yes    Is additional medical care by a physician indicated? []Yes [] No  Yes    Do you know of any previous injury or disease contributing to this condition or occupational disease? []Yes [] No (Explain if yes)                          No   Date  11/17/2024 Print Health Care Provider’s Name  Mali Pool P.A.-C. I certify that the employer’s copy of  this form was delivered to the employer on:   Address  [address] [place] [address] INSURER'S USE ONLY                       [facility]  [address] Provider’s Tax ID Number  [id number]   Telephone  Dept: [phone]    Health Care Provider’s Original or Electronic Signature  e-MALI Piper P.A.-C. Degree (MD,DO, DC,FAVIAN,APRN)  FAVIAN  Choose (if applicable)      ORIGINAL - TREATING HEALTHCARE PROVIDER PAGE 2 - INSURER/TPA PAGE 3 - EMPLOYER PAGE 4 - EMPLOYEE             Form C-4 (rev.08/23)

## 2024-11-17 NOTE — LETTER
PHYSICIAN’S AND CHIROPRACTIC PHYSICIAN'S   PROGRESS REPORT   CERTIFICATION OF DISABILITY Claim Number:     Social Security Number:    Patient’s Name: Edel Garcia Date of Injury: 11/15/2024   Employer: [employer] Name of [employer]CO (if applicable):      Patient’s Job Description/Occupation: [employer]       Previous Injuries/Diseases/Surgeries Contributing to the Condition:  No      Diagnosis: (W19.XXXA) Fall, initial encounter  (S09.90XA) Injury of head, initial encounter  (S89.91XA) Injury of right knee, initial encounter  (S69.92XA) Hand injury, left, initial encounter      Related to the Industrial Injury? Yes     Explain:        Objective Medical Findings: Chin contusion.  Scalp hematoma in the left occipital region.  No skull depression or deformity.  PERRLA, EOMs intact,  strengths.  Larceny strength, DTRs and gait appropriate.  A and O x 3.  No obvious motor, sensory or cognitive deficit  Right knee anterior swelling and bruising with decreased range of motion  Left thumb and second digit pain swelling and decreased range of motion.  No snuffbox tenderness  Multiple superficial abrasions         None - Discharged                         Stable  Yes                 Ratable  No        Generally Improved                         Condition Worsened                  Condition Same  May Have Suffered a Permanent Disability No     Treatment Plan:    OTC meds and RICE therapy         No Change in Therapy                  PT/OT Prescribed                      Medication May be Used While Working        Case Management                          PT/OT Discontinued    Consultation    Further Diagnostic Studies:    Prescription(s)      Right knee x-ray and left hand x-ray: Negative  Head CT: Negative          Released to FULL DUTY /No Restrictions on (Date):       Certified TOTALLY TEMPORARILY DISABLED (Indicate Dates) From:   To:    X  Released to RESTRICTED/Modified Duty on (Date): From: 11/17/2024  To: 11/21/2024  Restrictions Are:  Temporary      No Sitting    No Standing X   No Pulling Other:         No Bending at Waist     No Stooping X    No Lifting    X    No Carrying     No Walking Lifting Restricted to (lbs.):  < or = to 10 pounds    X   No Pushing        No Climbing     No Reaching Above Shoulders       Date of Next Visit:  11/21/2024  12:00 PM Date of this Exam: 11/17/2024 Physician/Chiropractic Physician Name: Ricky Pool P.A.-C. Physician/Chiropractic Physician Signature:  Lukasz Trujillo DO [facility]:  [address] [place] [address], [address] [place], [place] [address] - Telephone [phone] [place]:  [address] [place] [address], [address] [place], [place] [address] - Telephone [phone]    [email or web address]  D-39 (Rev. 10/24)

## 2024-11-18 NOTE — PROGRESS NOTES
"Subjective     Edel Cyndy Garcia is a very pleasant 64 y.o. female who presents with Injury ( New: DOI: 11/15/2024. /Went into bathroom area where floor was covered in water, lost balance and fell repeatedly. Last hit was on chin, pain on L side of face and head along with back of head. Lump on back of head, don't remember much of that day. )            HPI  Patient slipped on a floor covered in water at work.  She had quite a traumatic fall injuring and bumping multiple areas.  She did hit her chin in the back of her head.  There was no loss of consciousness.  She has had headache, dizziness, fatigue and mental fogginess.  She injured her anterior right knee and left thumb/index finger.  Multiple other superficial injuries.  No abdominal pain, hematuria, amnesia.  Patient does not take blood thinners.  No previous injuries.  No second job.      ROS           Objective     /72   Pulse 64   Temp 36.3 °C (97.4 °F)   Resp 18   Ht 1.676 m (5' 6\")   Wt 79.4 kg (175 lb)   SpO2 99%   BMI 28.25 kg/m²      Physical Exam    Chin contusion.  Scalp hematoma in the left occipital region.  No skull depression or deformity.  PERRLA, EOMs intact,  strengths.  Larceny strength, DTRs and gait appropriate.  A and O x 3.  No obvious motor, sensory or cognitive deficit  Right knee anterior swelling and bruising with decreased range of motion  Left thumb and second digit pain swelling and decreased range of motion.  No snuffbox tenderness  Multiple superficial abrasions                   Assessment & Plan      This is a very pleasant 64-year-old female presenting with multiple injuries following a fall at work.  Fortunately, all x-rays and CT scan were negative.  We will treat symptomatically with OTC meds and conservative measures including RICE therapy.  She does not work until Friday, therefore we will see her on Thursday to determine return to work status.  ER and red flag symptoms discussed at " length.  Assessment & Plan  Fall, initial encounter    Orders:    DX-KNEE COMPLETE 4+ RIGHT; Future    CT-HEAD W/O; Future    DX-HAND 3+ LEFT; Future    Injury of head, initial encounter    Orders:    CT-HEAD W/O; Future    Injury of right knee, initial encounter    Orders:    DX-KNEE COMPLETE 4+ RIGHT; Future    Hand injury, left, initial encounter    Orders:    DX-HAND 3+ LEFT; Future    Contusion of right knee, initial encounter         Sprain of left hand, initial encounter

## 2024-11-21 ENCOUNTER — OCCUPATIONAL MEDICINE (OUTPATIENT)
Dept: URGENT CARE | Facility: CLINIC | Age: 64
End: 2024-11-21
Payer: COMMERCIAL

## 2024-11-21 ENCOUNTER — APPOINTMENT (OUTPATIENT)
Dept: RADIOLOGY | Facility: IMAGING CENTER | Age: 64
End: 2024-11-21
Attending: PHYSICIAN ASSISTANT

## 2024-11-21 VITALS
HEIGHT: 68 IN | WEIGHT: 176.8 LBS | BODY MASS INDEX: 26.8 KG/M2 | DIASTOLIC BLOOD PRESSURE: 60 MMHG | SYSTOLIC BLOOD PRESSURE: 128 MMHG | TEMPERATURE: 97.6 F | RESPIRATION RATE: 20 BRPM | HEART RATE: 56 BPM | OXYGEN SATURATION: 98 %

## 2024-11-21 DIAGNOSIS — S89.92XA INJURY OF LEFT KNEE, INITIAL ENCOUNTER: ICD-10-CM

## 2024-11-21 DIAGNOSIS — S80.01XA CONTUSION OF RIGHT KNEE, INITIAL ENCOUNTER: ICD-10-CM

## 2024-11-21 DIAGNOSIS — S63.92XA SPRAIN OF LEFT HAND, INITIAL ENCOUNTER: ICD-10-CM

## 2024-11-21 DIAGNOSIS — S99.921A INJURY OF RIGHT FOOT, INITIAL ENCOUNTER: ICD-10-CM

## 2024-11-21 DIAGNOSIS — S09.90XD INJURY OF HEAD, SUBSEQUENT ENCOUNTER: ICD-10-CM
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
## 2024-11-21 PROCEDURE — 99213 OFFICE O/P EST LOW 20 MIN: CPT | Performed by: PHYSICIAN ASSISTANT

## 2024-11-21 PROCEDURE — 3074F SYST BP LT 130 MM HG: CPT | Performed by: PHYSICIAN ASSISTANT

## 2024-11-21 PROCEDURE — 73564 X-RAY EXAM KNEE 4 OR MORE: CPT | Mod: TC,LT | Performed by: RADIOLOGY

## 2024-11-21 PROCEDURE — 73630 X-RAY EXAM OF FOOT: CPT | Mod: TC,RT | Performed by: RADIOLOGY

## 2024-11-21 PROCEDURE — 3078F DIAST BP <80 MM HG: CPT | Performed by: PHYSICIAN ASSISTANT

## 2024-11-21 ASSESSMENT — ENCOUNTER SYMPTOMS
NAUSEA: 1
BLURRED VISION: 0
HEADACHES: 1
DOUBLE VISION: 0
FALLS: 1
VOMITING: 0
FEVER: 0

## 2024-11-21 ASSESSMENT — FIBROSIS 4 INDEX: FIB4 SCORE: 0.92

## 2024-11-21 NOTE — LETTER
"PHYSICIAN’S AND CHIROPRACTIC PHYSICIAN'S   PROGRESS REPORT   CERTIFICATION OF DISABILITY Claim Number:     Social Security Number:    Patient’s Name: Edel Garcia Date of Injury: 11/15/2024   Employer: [employer] Name of MCO (if applicable):      Patient’s Job Description/Occupation: [employer]       Previous Injuries/Diseases/Surgeries Contributing to the Condition:         Diagnosis: (W19.XXXD) Fall, subsequent encounter  (S80.01XA) Contusion of right knee, initial encounter  (S09.90XD) Injury of head, subsequent encounter  (S63.92XA) Sprain of left hand, initial encounter  (S89.92XA) Injury of left knee, initial encounter  (S99.921A) Injury of right foot, initial encounter      Related to the Industrial Injury? Yes     Explain: The patient presents for Workmen's Comp. follow-up.    DOI: 11/15/2024 -copied from original visit- \"Patient slipped on a floor covered in water at work. She had quite a traumatic fall injuring and bumping multiple areas. She did hit her chin in the back of her head. There was no loss of consciousness. She has had headache, dizziness, fatigue and mental fogginess. She injured her anterior right knee and left thumb/index finger. Multiple other superficial injuries. No abdominal pain, hematuria, amnesia. Patient does not take blood thinners. No previous injuries. No second job.\"    Visit #2:  Today, the patient reports continued pain as a result of her acute injuries.  The patient states she is experiencing pain to her bilateral hands, left greater then right.  The patient states the pain of her left hand is localized to her left thumb with associated swelling.  The patient reports radiation of pain from her left hand to her left forearm and left shoulder.  The patient states the pain to her left hand and left upper extremity is worse with movement and use.  The patient reports continued pain to her bilateral knees.  The patient states she is experiencing swelling and " bruising of her right knee.  The patient reports decreased range of motion of her right knee, as well as increased pain with walking and weightbearing.  The patient states she is also experiencing pain to her right foot, specifically the third, fourth, and fifth digits.  She reports no associated swelling or bruising to the right foot.  The patient states her right foot pain is intermittent.  The patient also reports a continued headache with nausea.  She reports no vision changes.  No double vision.  No blurred vision.  She also reports no numbness, tingling, weakness of her extremities.  The patient has taken OTC ibuprofen for her current symptoms.  She was also applied ice to the injured areas.  The patient states she has not yet returned to work since the initial injury.  The patient states she is scheduled to return to work tomorrow.      Objective Medical Findings: Physical Exam  Constitutional:       General: She is not in acute distress.     Appearance: Normal appearance. She is well-developed. She is not ill-appearing.   HENT:      Head: Normocephalic.      Comments:   A localized area of ecchymosis is present to the patient's left chin with localized tenderness to palpation and mild edema.  No overlying erythema.  No increased warmth.  No open wounds/abrasions.  The patient's TMJ joints are intact with full range of motion.  No popping/clicking with opening/closing of the mouth.     Right Ear: External ear normal.      Left Ear: External ear normal.      Nose: Nose normal.   Eyes:      Extraocular Movements: Extraocular movements intact.      Conjunctiva/sclera: Conjunctivae normal.   Cardiovascular:      Rate and Rhythm: Normal rate.   Pulmonary:      Effort: Pulmonary effort is normal.   Musculoskeletal:      Cervical back: Normal range of motion and neck supple.      Comments:   Left Hand:  Tenderness to the base of the left thumb with localized edema.  No additional tenderness of the left hand.  No  ecchymosis.  No overlying erythema.  No increased warmth.  No open wounds/abrasions.  Decreased ROM -the patient demonstrates limited range of motion of the left hand secondary to pain  Neurovascular intact distally  Strength 5/5    Right Hand:  No tenderness to palpation.  No edema.  No ecchymosis.  No overlying erythema.  No increased warmth.  No open wounds/abrasions.  ROM intact -the patient demonstrates full active range of motion of the right hand  Neurovascular intact distally  Strength 5/5    Right Knee:  A localized contusion is present to the anterior aspect of the right knee with associated edema and ecchymosis in various stages of healing.  Tenderness to palpation overlying the contusion.  No overlying erythema.  No increased warmth.  No open wounds/abrasions.  Decreased ROM -the patient demonstrates limited range of motion of the right knee secondary to pain and swelling  Neurovascular intact distally  Strength 5/5  Antalgic gait    Left Knee:  Tenderness to the anterior aspect of the left knee with mild edema.  No overlying erythema.  No increased warmth.  No ecchymosis.  No wounds/abrasions.  ROM intact -the patient demonstrates full active range of motion of the left knee  Neurovascular intact distally  Strength 5/5  Antalgic gait    Right Foot:  Tenderness to the lateral aspect of the right foot overlying the third, fourth, and fifth metatarsals.  No edema.  No ecchymosis.  No overlying erythema.  No increased warmth.  No rashes/lesions.  ROM intact -the patient straits full active range of motion of the right foot  Neurovascular intact distally  Strength 5/5  Antalgic gait   Skin:     General: Skin is warm and dry.   Neurological:      General: No focal deficit present.      Mental Status: She is alert and oriented to person, place, and time.          None - Discharged                         Stable  Yes                 Ratable  No        Generally Improved                         Condition  Worsened               X   Condition Same  May Have Suffered a Permanent Disability No     Treatment Plan:    Plan:  Light Duty Work Restrictions - D39 provided   Tylenol and or Motrin for pain/discomfort  RICE  Monitor for worsening signs and or symptoms  Follow-up in 10 days for reassessment  Return to clinic or go to the ED if symptoms worsen or fail to improve, or if the patient develop worsening/increasing/persistent pain/tenderness, swelling, bruising, increased redness or warmth, decreased range of motion, numbness, tingling, weakness, headache, vision changes, nausea, vomiting, altered mental status, dizziness, fever/chills, and/or any concerning symptoms.         No Change in Therapy                  PT/OT Prescribed                      Medication May be Used While Working        Case Management                          PT/OT Discontinued    Consultation    Further Diagnostic Studies:    Prescription(s)                 Released to FULL DUTY /No Restrictions on (Date):       Certified TOTALLY TEMPORARILY DISABLED (Indicate Dates) From:   To:    X  Released to RESTRICTED/Modified Duty on (Date): From: 11/21/2024 To: 12/2/2024  Restrictions Are:         No Sitting    No Standing X   No Pulling Other: Recommend seated/desk work only.       No Bending at Waist     No Stooping X    No Lifting    X    No Carrying X    No Walking Lifting Restricted to (lbs.):  < or = to 10 pounds    X   No Pushing     X   No Climbing     No Reaching Above Shoulders       Date of Next Visit:  12/2/2024 Date of this Exam: 11/21/2024 Physician/Chiropractic Physician Name: Tasha Dueñas P.A.-C. Physician/Chiropractic Physician Signature:  Lukasz Truijllo DO MPH     [place]:  [address] [place] [address], [address] [place], [place] [address] - Telephone [phone] [place]:  [address] [place] [address], [address] [place], [place] [address] - Telephone [phone]    [email or web address]  D-39 (Rev. 10/24)

## 2024-11-21 NOTE — PROGRESS NOTES
"Subjective     Edel Cyndy Garcia is a 64 y.o. female who presents with Follow-Up (X11/15/24  head/neck/bilateral knee and hands injury )            HPI      The patient presents for Workmen's Comp. follow-up.    DOI: 11/15/2024 -copied from original visit- \"Patient slipped on a floor covered in water at work. She had quite a traumatic fall injuring and bumping multiple areas. She did hit her chin in the back of her head. There was no loss of consciousness. She has had headache, dizziness, fatigue and mental fogginess. She injured her anterior right knee and left thumb/index finger. Multiple other superficial injuries. No abdominal pain, hematuria, amnesia. Patient does not take blood thinners. No previous injuries. No second job.\"    Visit #2:  Today, the patient reports continued pain as a result of her acute injuries.  The patient states she is experiencing pain to her bilateral hands, left greater then right.  The patient states the pain of her left hand is localized to her left thumb with associated swelling.  The patient reports radiation of pain from her left hand to her left forearm and left shoulder.  The patient states the pain to her left hand and left upper extremity is worse with movement and use.  The patient reports continued pain to her bilateral knees.  The patient states she is experiencing swelling and bruising of her right knee.  The patient reports decreased range of motion of her right knee, as well as increased pain with walking and weightbearing.  The patient states she is also experiencing pain to her right foot, specifically the third, fourth, and fifth digits.  She reports no associated swelling or bruising to the right foot.  The patient states her right foot pain is intermittent.  The patient also reports a continued headache with nausea.  She reports no vision changes.  No double vision.  No blurred vision.  She also reports no numbness, tingling, weakness of her " "extremities.  The patient has taken OTC ibuprofen for her current symptoms.  She was also applied ice to the injured areas.  The patient states she has not yet returned to work since the initial injury.  The patient states she is scheduled to return to work tomorrow.    PMH: Reviewed in Epic, no pertinent findings.  MEDS: Reviewed in Epic.  ALLERGIES: Reviewed in Epic.  SURGHX: Reviewed in Epic.  SOCHX: Reviewed in Epic.  FH: Family history was reviewed, no pertinent findings to report.      Review of Systems   Constitutional:  Negative for fever.   Eyes:  Negative for blurred vision and double vision.   Gastrointestinal:  Positive for nausea. Negative for vomiting.   Musculoskeletal:  Positive for falls and joint pain.   Neurological:  Positive for headaches.              Objective     /60   Pulse (!) 56   Temp 36.4 °C (97.6 °F) (Temporal)   Resp 20   Ht 1.727 m (5' 8\")   Wt 80.2 kg (176 lb 12.8 oz)   SpO2 98%   BMI 26.88 kg/m²      Physical Exam  Constitutional:       General: She is not in acute distress.     Appearance: Normal appearance. She is well-developed. She is not ill-appearing.   HENT:      Head: Normocephalic.      Comments:   A localized area of ecchymosis is present to the patient's left chin with localized tenderness to palpation and mild edema.  No overlying erythema.  No increased warmth.  No open wounds/abrasions.  The patient's TMJ joints are intact with full range of motion.  No popping/clicking with opening/closing of the mouth.     Right Ear: External ear normal.      Left Ear: External ear normal.      Nose: Nose normal.   Eyes:      Extraocular Movements: Extraocular movements intact.      Conjunctiva/sclera: Conjunctivae normal.   Cardiovascular:      Rate and Rhythm: Normal rate.   Pulmonary:      Effort: Pulmonary effort is normal.   Musculoskeletal:      Cervical back: Normal range of motion and neck supple.      Comments:   Left Hand:  Tenderness to the base of the left " thumb with localized edema.  No additional tenderness of the left hand.  No ecchymosis.  No overlying erythema.  No increased warmth.  No open wounds/abrasions.  Decreased ROM -the patient demonstrates limited range of motion of the left hand secondary to pain  Neurovascular intact distally  Strength 5/5    Right Hand:  No tenderness to palpation.  No edema.  No ecchymosis.  No overlying erythema.  No increased warmth.  No open wounds/abrasions.  ROM intact -the patient demonstrates full active range of motion of the right hand  Neurovascular intact distally  Strength 5/5    Right Knee:  A localized contusion is present to the anterior aspect of the right knee with associated edema and ecchymosis in various stages of healing.  Tenderness to palpation overlying the contusion.  No overlying erythema.  No increased warmth.  No open wounds/abrasions.  Decreased ROM -the patient demonstrates limited range of motion of the right knee secondary to pain and swelling  Neurovascular intact distally  Strength 5/5  Antalgic gait    Left Knee:  Tenderness to the anterior aspect of the left knee with mild edema.  No overlying erythema.  No increased warmth.  No ecchymosis.  No wounds/abrasions.  ROM intact -the patient demonstrates full active range of motion of the left knee  Neurovascular intact distally  Strength 5/5  Antalgic gait    Right Foot:  Tenderness to the lateral aspect of the right foot overlying the third, fourth, and fifth metatarsals.  No edema.  No ecchymosis.  No overlying erythema.  No increased warmth.  No rashes/lesions.  ROM intact -the patient straits full active range of motion of the right foot  Neurovascular intact distally  Strength 5/5  Antalgic gait   Skin:     General: Skin is warm and dry.   Neurological:      General: No focal deficit present.      Mental Status: She is alert and oriented to person, place, and time.                  Progress:  Reviewed the patient's previous imaging studies,  including her CT head, left hand x-ray, and right knee x-ray.      Left Knee XR:  COMPARISON: None     FINDINGS:  There is no fracture or dislocation.  The visualized osseous structures are in anatomic alignment.  Joint spaces are preserved. Mild medial compartment marginal osteophytes.  Bone mineralization is age-appropriate..  No significant joint effusion.     IMPRESSION:  No acute osseous abnormality.    Right Foot XR:  COMPARISON:  None.     FINDINGS:  There is no fracture or dislocation.  The visualized osseous structures are in anatomic alignment.  The joint spaces are preserved.  Bone mineralization is age-appropriate..     IMPRESSION:  No acute osseous abnormality.         Assessment & Plan        Assessment & Plan  Fall, subsequent encounter         Contusion of right knee, initial encounter         Injury of head, subsequent encounter         Sprain of left hand, initial encounter         Injury of left knee, initial encounter    Orders:    DX-KNEE COMPLETE 4+ LEFT; Future    Injury of right foot, initial encounter    Orders:    DX-FOOT-COMPLETE 3+ RIGHT; Future              Differential diagnoses, supportive care, and indications for immediate follow-up discussed with patient.   Instructed to return to clinic or nearest emergency department for any change in condition, further concerns, or worsening of symptoms.    Plan:  Light Duty Work Restrictions - D39 provided   Tylenol and or Motrin for pain/discomfort  RICE  Monitor for worsening signs and or symptoms  Follow-up in 10 days for reassessment  Return to clinic or go to the ED if symptoms worsen or fail to improve, or if the patient develop worsening/increasing/persistent pain/tenderness, swelling, bruising, increased redness or warmth, decreased range of motion, numbness, tingling, weakness, headache, vision changes, nausea, vomiting, altered mental status, dizziness, fever/chills, and/or any concerning symptoms.    Discussed plan with the patient,  and she agrees to the above.     I personally reviewed prior external notes and test results pertinent to today's visit.  I have independently reviewed and interpreted all diagnostics ordered during this urgent care visit.     Please note that this dictation was created using voice recognition software. I have made every reasonable attempt to correct obvious errors, but I expect that there may be errors of grammar and possibly content that I did not discover before finalizing the note.     This note was electronically signed by Tasha Dueñas PA-C

## 2024-12-04 ENCOUNTER — OCCUPATIONAL MEDICINE (OUTPATIENT)
Dept: URGENT CARE | Facility: CLINIC | Age: 64
End: 2024-12-04
Payer: COMMERCIAL

## 2024-12-04 VITALS
WEIGHT: 162 LBS | TEMPERATURE: 97.6 F | OXYGEN SATURATION: 97 % | BODY MASS INDEX: 25.43 KG/M2 | HEIGHT: 67 IN | DIASTOLIC BLOOD PRESSURE: 70 MMHG | RESPIRATION RATE: 16 BRPM | SYSTOLIC BLOOD PRESSURE: 122 MMHG | HEART RATE: 74 BPM

## 2024-12-04 DIAGNOSIS — S63.92XD SPRAIN OF LEFT HAND, SUBSEQUENT ENCOUNTER: ICD-10-CM

## 2024-12-04 DIAGNOSIS — S89.91XD INJURY OF RIGHT KNEE, SUBSEQUENT ENCOUNTER: ICD-10-CM

## 2024-12-04 DIAGNOSIS — S69.92XD HAND INJURY, LEFT, SUBSEQUENT ENCOUNTER: ICD-10-CM

## 2024-12-04 DIAGNOSIS — S89.92XD INJURY OF LEFT KNEE, SUBSEQUENT ENCOUNTER: ICD-10-CM
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
## 2024-12-04 PROCEDURE — 3078F DIAST BP <80 MM HG: CPT

## 2024-12-04 PROCEDURE — 99214 OFFICE O/P EST MOD 30 MIN: CPT

## 2024-12-04 PROCEDURE — 3074F SYST BP LT 130 MM HG: CPT

## 2024-12-04 ASSESSMENT — FIBROSIS 4 INDEX: FIB4 SCORE: 0.92

## 2024-12-04 NOTE — LETTER
PHYSICIAN’S AND CHIROPRACTIC PHYSICIAN'S   PROGRESS REPORT   CERTIFICATION OF DISABILITY Claim Number:     Social Security Number:    Patient’s Name: Edel Garcia Date of Injury: 11/15/2024   Employer: [employer] Name of [employer] (if applicable):      Patient’s Job Description/Occupation: [employer]       Previous Injuries/Diseases/Surgeries Contributing to the Condition:  None      Diagnosis: No diagnosis found.      Related to the Industrial Injury? Yes     Explain: Patient slept on the floor covered in water      Objective Medical Findings: Chin: No focal erythema or swelling.     Left Hand:   Tenderness to the base of the left thumb, mild swelling and erythema.  No additional tenderness of the left hand.  No ecchymosis.  No overlying erythema. NO wounds/abrasions.   Decreased ROM -the patient demonstrates limited range of motion of the left hand secondary to pain   Neurovascular intact distally   Strength 5/5     Right Hand:   No tenderness to palpation.  No edema.  No ecchymosis.  No overlying erythema.  No increased warmth.  No open wounds/abrasions.   ROM intact -the patient demonstrates full active range of motion of the right hand   Neurovascular intact distally   Strength 5/5     Right Knee:   Focal anterior contusion is present associated edema and ecchymosis in various stages of healing.  Tenderness to palpation overlying the contusion.  No overlying erythema.  No increased warmth.  No open wounds/abrasions.   Decreased ROM -the patient demonstrates limited range of motion of the right knee secondary to pain and swelling   Neurovascular intact distally   Strength 5/5   Antalgic gait     Left Knee:   Tenderness to the anterior, medial and lateral aspect of the left knee with mild edema.  No overlying erythema.  No increased warmth.  No ecchymosis.  No wounds/abrasions.   ROM intact -the patient demonstrates full active range of motion of the left knee   Neurovascular intact distally    Strength 5/5   Antalgic gait                  None - Discharged                         Stable  No                 Ratable  No     X   Generally Improved                         Condition Worsened                  Condition Same  May Have Suffered a Permanent Disability No     Treatment Plan:    Tylenol and Motrin as needed. Work restrictions.          No Change in Therapy                  PT/OT Prescribed                      Medication May be Used While Working        Case Management                          PT/OT Discontinued    Consultation    Further Diagnostic Studies:    Prescription(s)                 Released to FULL DUTY /No Restrictions on (Date):       Certified TOTALLY TEMPORARILY DISABLED (Indicate Dates) From:   To:    X  Released to RESTRICTED/Modified Duty on (Date): From: 12/4/2024 To: 12/11/2024  Restrictions Are:  Temporary      No Sitting    No Standing    No Pulling Other: No single lifting with left hand        No Bending at Waist     No Stooping     No Lifting        No Carrying     No Walking Lifting Restricted to (lbs.):  < or = to 10 pounds       No Pushing     X   No Climbing     No Reaching Above Shoulders       Date of Next Visit:  12/11/2024  11:00 AM Date of this Exam: 12/4/2024 Physician/Chiropractic Physician Name: TEX Del Rosario Physician/Chiropractic Physician Signature:  Lukasz Trujillo DO MPH     [place]:  [address] [place] [address], [address] [place], [place] [address] - Telephone [phone] [place]:  [address] [place] [address], [address] [place], [place] [address] - Telephone [phone]    [email or web address]  D-39 (Rev. 10/24)

## 2024-12-04 NOTE — PROGRESS NOTES
"  CHIEF COMPLAINT  Chief Complaint   Patient presents with    Follow-Up     Patients states she does feel better, left thumb is still having a lot of pain      Subjective:   Edel Garcia is a 64 y.o. female who presents for Follow-Up (Patients states she does feel better, left thumb is still having a lot of pain )  DOI: 11/15/2024 -copied from original visit- \"Patient slipped on a floor covered in water at work. She had quite a traumatic fall injuring and bumping multiple areas. She did hit her chin in the back of her head. There was no loss of consciousness. She has had headache, dizziness, fatigue and mental fogginess. She injured her anterior right knee and left thumb/index finger. Multiple other superficial injuries. No abdominal pain, hematuria, amnesia. Patient does not take blood thinners. No previous injuries. No second job.\"     F/V #3 12/04/24: Patient reports improvement with pain overall, but states she is still having some pain in her left thumb.  Denies any numbness or tingling.  No loss of strength or sensation.  Reports use of OTC analgesics for alleviation of symptoms.  Patient reports no difficulty with work restrictions.    PAST MEDICAL HISTORY  Patient Active Problem List    Diagnosis Date Noted    PTSD (post-traumatic stress disorder) 06/23/2023    CHRISTOPH (generalized anxiety disorder) 09/22/2020    Vitamin D deficiency 06/05/2019    Hair loss 06/03/2019    Insomnia 06/03/2019       SURGICAL HISTORY   has a past surgical history that includes dilation and curettage (2000).    ALLERGIES  No Known Allergies    CURRENT MEDICATIONS  Home Medications       Reviewed by Noah Hernandez Ass't (Medical Assistant) on 12/04/24 at 1107  Med List Status: <None>     Medication Last Dose Status   clonazePAM (KLONOPIN) 0.5 MG Tab Taking Active   FLUoxetine (PROZAC) 20 MG Cap Taking Active   Misc Natural Products (OSTEO BI-FLEX ADV DOUBLE ST PO) Taking Active   traZODone (DESYREL) 50 " "MG Tab Taking Active                    SOCIAL HISTORY  Social History     Tobacco Use    Smoking status: Never    Smokeless tobacco: Never   Vaping Use    Vaping status: Never Used   Substance and Sexual Activity    Alcohol use: Not Currently     Comment: 1 drink few times/week    Drug use: Yes     Types: Marijuana, Inhaled     Comment: few times a month    Sexual activity: Not Currently       FAMILY HISTORY  Family History   Problem Relation Age of Onset    GI Disease Mother         IBS    Alcohol abuse Father     Cancer Father         mesothelioma    Depression Father     Hypertension Father     Seizures Father         epilepsy    Suicide Attempts Niece          Medications, Allergies, and current problem list reviewed today in Epic.     Objective:     /70   Pulse 74   Temp 36.4 °C (97.6 °F) (Temporal)   Resp 16   Ht 1.689 m (5' 6.5\")   Wt 73.5 kg (162 lb)   SpO2 97%     Physical Exam  Vitals reviewed.   Constitutional:       General: She is not in acute distress.     Appearance: Normal appearance.   Musculoskeletal:      Comments:   Left Hand:   Tenderness to the base of the left thumb, mild swelling and erythema.  No additional tenderness of the left hand.  No ecchymosis.  No overlying erythema. NO wounds/abrasions.   Decreased ROM -the patient demonstrates limited range of motion of the left hand secondary to pain   Neurovascular intact distally   Strength 5/5     Right Hand:   No tenderness to palpation.  No edema.  No ecchymosis.  No overlying erythema.  No increased warmth.  No open wounds/abrasions.   ROM intact -the patient demonstrates full active range of motion of the right hand   Neurovascular intact distally   Strength 5/5     Right Knee:   Focal anterior contusion is present associated edema and ecchymosis in various stages of healing.  Tenderness to palpation overlying the contusion.  No overlying erythema.  No increased warmth.  No open wounds/abrasions.   Decreased ROM -the patient " demonstrates limited range of motion of the right knee secondary to pain and swelling   Neurovascular intact distally   Strength 5/5   Antalgic gait     Left Knee:   Tenderness to the anterior, medial and lateral aspect of the left knee with mild edema.  No overlying erythema.  No increased warmth.  No ecchymosis.  No wounds/abrasions.   ROM intact -the patient demonstrates full active range of motion of the left knee   Neurovascular intact distally   Strength 5/5   Antalgic gait      Skin:     General: Skin is warm.      Capillary Refill: Capillary refill takes less than 2 seconds.      Comments: Chin: No focal erythema or swelling.    Neurological:      General: No focal deficit present.      Mental Status: She is alert.   Psychiatric:         Mood and Affect: Mood normal.         Assessment/Plan:     Diagnosis and associated orders:     1. Hand injury, left, subsequent encounter        2. Injury of right knee, subsequent encounter        3. Fall, subsequent encounter        4. Sprain of left hand, subsequent encounter        5. Injury of left knee, subsequent encounter           Comments/MDM:     Work restrictions to include no single lifting with left hand.  Weight limits not to exceed 10 pounds.  Continue with Tylenol/Motrin as needed.  Return to clinic in 1 week for reevaluation, or sooner if needed.         Differential diagnosis, natural history, supportive care, and indications for immediate follow-up discussed.    Advised the patient to follow-up with the primary care physician for recheck, reevaluation, and consideration of further management.    Please note that this dictation was created using voice recognition software. I have made a reasonable attempt to correct obvious errors, but I expect that there are errors of grammar and possibly content that I did not discover before finalizing the note.    This note was electronically signed by TEX Del Rosario

## 2024-12-12 ENCOUNTER — OCCUPATIONAL MEDICINE (OUTPATIENT)
Dept: URGENT CARE | Facility: CLINIC | Age: 64
End: 2024-12-12
Payer: COMMERCIAL

## 2024-12-12 ENCOUNTER — APPOINTMENT (OUTPATIENT)
Dept: URGENT CARE | Facility: CLINIC | Age: 64
End: 2024-12-12

## 2024-12-12 VITALS
BODY MASS INDEX: 25.88 KG/M2 | SYSTOLIC BLOOD PRESSURE: 122 MMHG | HEART RATE: 61 BPM | DIASTOLIC BLOOD PRESSURE: 76 MMHG | OXYGEN SATURATION: 96 % | HEIGHT: 66 IN | RESPIRATION RATE: 17 BRPM | TEMPERATURE: 96.3 F | WEIGHT: 161 LBS

## 2024-12-12 DIAGNOSIS — S63.92XD SPRAIN OF LEFT HAND, SUBSEQUENT ENCOUNTER: ICD-10-CM

## 2024-12-12 DIAGNOSIS — S80.01XD CONTUSION OF RIGHT KNEE, SUBSEQUENT ENCOUNTER: ICD-10-CM
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
## 2024-12-12 PROCEDURE — 3074F SYST BP LT 130 MM HG: CPT | Performed by: NURSE PRACTITIONER

## 2024-12-12 PROCEDURE — 3078F DIAST BP <80 MM HG: CPT | Performed by: NURSE PRACTITIONER

## 2024-12-12 PROCEDURE — 99213 OFFICE O/P EST LOW 20 MIN: CPT | Performed by: NURSE PRACTITIONER

## 2024-12-12 ASSESSMENT — FIBROSIS 4 INDEX: FIB4 SCORE: 0.92

## 2024-12-12 NOTE — LETTER
PHYSICIAN’S AND CHIROPRACTIC PHYSICIAN'S   PROGRESS REPORT   CERTIFICATION OF DISABILITY Claim Number:     Social Security Number:    Patient’s Name: Edel Garcia Date of Injury: 11/15/2024   Employer: [employer] Name of [employer] (if applicable):      Patient’s Job Description/Occupation: [employer]       Previous Injuries/Diseases/Surgeries Contributing to the Condition:  none      Diagnosis: (W19.XXXD) Fall, subsequent encounter  (S80.01XD) Contusion of right knee, subsequent encounter  (S63.92XD) Sprain of left hand, subsequent encounter      Related to the Industrial Injury? Yes     Explain: DOI: 11/17/24-  Patient slept on the floor covered in water. She struck her head, chi, left hand and right knee.     FV#3: 12/12/24- reports about 70% improvement since DOI. She still has mild pain and weakness to left hand, mild pain to right knee. Mild intermittent headaches. 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       Objective Medical Findings: Left Hand:   Tenderness to the base of the left thumb, mild swelling and erythema. No additional tenderness of the left hand.  No ecchymosis.  No overlying erythema. NO wounds/abrasions. Neurovascular intact distally.   Strength 5/5        Right Knee:   Focal anterior TTP is present, no associated edema and ecchymosis. No overlying erythema.  No increased warmth.  No open wounds/abrasions. Neurovascular intact distally. Normal gait.            None - Discharged                         Stable  No                 Ratable  No     X   Generally Improved                         Condition Worsened                  Condition Same  May Have Suffered a Permanent Disability No     Treatment Plan:              No Change in Therapy                  PT/OT Prescribed                      Medication May be Used While Working        Case Management                          PT/OT Discontinued    Consultation    Further Diagnostic Studies:    Prescription(s)                X  Released to FULL DUTY /No Restrictions on (Date):   12/12/2024    Certified TOTALLY TEMPORARILY DISABLED (Indicate Dates) From:   To:      Released to RESTRICTED/Modified Duty on (Date): From:   To:    Restrictions Are:         No Sitting    No Standing    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       No Bending at Waist     No Stooping     No Lifting        No Carrying     No Walking Lifting Restricted to (lbs.):          No Pushing        No Climbing     No Reaching Above Shoulders       Date of Next Visit:  1/2/2025 transfer to [facility]  10:45AM Date of this Exam: 12/12/2024 Physician/Chiropractic Physician Name: TEX Lees Physician/Chiropractic Physician Signature:  Lukasz Trujillo DO MPH     [place]:  [address] [place] [address], [address] [place], [place] [address] - Telephone [phone] [place]:  [address] [place] [address], [address] [place], [place] [address] - Telephone [phone]    [email or web address]  D-39 (Rev. 10/24)

## 2024-12-16 ASSESSMENT — ENCOUNTER SYMPTOMS: HEADACHES: 1

## 2024-12-16 NOTE — PROGRESS NOTES
"Subjective     Edel Cyndy Garcia is a 64 y.o. female who presents with Other ([facility] F/U 11/15/2024 Chin, head, R should/arm, hands, knees, spine getting better but still having pain.)            Explain: DOI: 11/17/24-  Patient slept on the floor covered in water. She struck her head, chin, left hand and right knee.      FV#3: 12/12/24- reports about 70% improvement since DOI. She still has mild pain and weakness to left hand, mild pain to right knee. Mild intermittent headaches. 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           Review of Systems   Musculoskeletal:         Left hand and right knee pain   Neurological:  Positive for headaches.   All other systems reviewed and are negative.             Objective     /76   Pulse 61   Temp (!) 35.7 °C (96.3 °F) (Temporal)   Resp 17   Ht 1.676 m (5' 6\")   Wt 73 kg (161 lb)   SpO2 96%   BMI 25.99 kg/m²      Physical Exam  Vitals and nursing note reviewed.   Constitutional:       Appearance: Normal appearance. She is normal weight.   HENT:      Head: Normocephalic and atraumatic.      Nose: Nose normal.      Mouth/Throat:      Mouth: Mucous membranes are moist.      Pharynx: Oropharynx is clear.   Eyes:      Extraocular Movements: Extraocular movements intact.      Pupils: Pupils are equal, round, and reactive to light.   Cardiovascular:      Rate and Rhythm: Normal rate and regular rhythm.   Pulmonary:      Effort: Pulmonary effort is normal.   Musculoskeletal:         General: Normal range of motion.      Cervical back: Normal range of motion.   Skin:     General: Skin is warm and dry.      Capillary Refill: Capillary refill takes less than 2 seconds.   Neurological:      General: No focal deficit present.      Mental Status: She is alert and oriented to person, place, and time. Mental status is at baseline.   Psychiatric:         Mood and Affect: Mood normal.         Speech: Speech normal.         Thought Content: Thought content " normal.         Judgment: Judgment normal.         Left Hand:   Tenderness to the base of the left thumb, mild swelling and erythema. No additional tenderness of the left hand.  No ecchymosis.  No overlying erythema. NO wounds/abrasions. Neurovascular intact distally.   Strength 5/5        Right Knee:   Focal anterior TTP is present, no associated edema and ecchymosis. No overlying erythema.  No increased warmth.  No open wounds/abrasions. Neurovascular intact distally. Normal gait.                      Assessment & Plan        Assessment & Plan  Fall, subsequent encounter         Contusion of right knee, subsequent encounter         Sprain of left hand, subsequent encounter          She is making improvement, but slowly  I think it is best at this time to transfer to [facility] for further appts  FV in 2 weeks

## 2025-05-13 ENCOUNTER — APPOINTMENT (OUTPATIENT)
Dept: MEDICAL GROUP | Age: 65
End: 2025-05-13
Payer: COMMERCIAL

## 2025-05-22 ENCOUNTER — HOSPITAL ENCOUNTER (OUTPATIENT)
Dept: RADIOLOGY | Facility: MEDICAL CENTER | Age: 65
End: 2025-05-22
Attending: FAMILY MEDICINE
Payer: COMMERCIAL

## 2025-05-22 ENCOUNTER — APPOINTMENT (OUTPATIENT)
Dept: MEDICAL GROUP | Age: 65
End: 2025-05-22
Payer: COMMERCIAL

## 2025-05-22 DIAGNOSIS — Z12.31 VISIT FOR SCREENING MAMMOGRAM: ICD-10-CM

## 2025-05-22 PROCEDURE — 77067 SCR MAMMO BI INCL CAD: CPT

## 2025-05-28 ENCOUNTER — RESULTS FOLLOW-UP (OUTPATIENT)
Dept: MEDICAL GROUP | Age: 65
End: 2025-05-28